# Patient Record
Sex: FEMALE | Race: WHITE | HISPANIC OR LATINO | Employment: FULL TIME | URBAN - NONMETROPOLITAN AREA
[De-identification: names, ages, dates, MRNs, and addresses within clinical notes are randomized per-mention and may not be internally consistent; named-entity substitution may affect disease eponyms.]

---

## 2018-07-31 ENCOUNTER — HOSPITAL ENCOUNTER (OUTPATIENT)
Facility: MEDICAL CENTER | Age: 38
End: 2018-07-31
Attending: PHYSICIAN ASSISTANT
Payer: COMMERCIAL

## 2018-07-31 ENCOUNTER — OFFICE VISIT (OUTPATIENT)
Dept: URGENT CARE | Facility: PHYSICIAN GROUP | Age: 38
End: 2018-07-31
Payer: COMMERCIAL

## 2018-07-31 VITALS
HEIGHT: 67 IN | WEIGHT: 130 LBS | RESPIRATION RATE: 16 BRPM | OXYGEN SATURATION: 99 % | SYSTOLIC BLOOD PRESSURE: 108 MMHG | HEART RATE: 80 BPM | TEMPERATURE: 98 F | BODY MASS INDEX: 20.4 KG/M2 | DIASTOLIC BLOOD PRESSURE: 70 MMHG

## 2018-07-31 DIAGNOSIS — R53.83 FATIGUE, UNSPECIFIED TYPE: ICD-10-CM

## 2018-07-31 DIAGNOSIS — N30.01 ACUTE CYSTITIS WITH HEMATURIA: ICD-10-CM

## 2018-07-31 LAB
APPEARANCE UR: NORMAL
BILIRUB UR STRIP-MCNC: NORMAL MG/DL
COLOR UR AUTO: YELLOW
GLUCOSE BLD-MCNC: 109 MG/DL (ref 70–100)
GLUCOSE UR STRIP.AUTO-MCNC: NORMAL MG/DL
INT CON NEG: NORMAL
INT CON POS: NORMAL
KETONES UR STRIP.AUTO-MCNC: NORMAL MG/DL
LEUKOCYTE ESTERASE UR QL STRIP.AUTO: NORMAL
NITRITE UR QL STRIP.AUTO: NORMAL
PH UR STRIP.AUTO: 7 [PH] (ref 5–8)
POC URINE PREGNANCY TEST: NEGATIVE
PROT UR QL STRIP: NORMAL MG/DL
RBC UR QL AUTO: NORMAL
SP GR UR STRIP.AUTO: 1.02
UROBILINOGEN UR STRIP-MCNC: 0.2 MG/DL

## 2018-07-31 PROCEDURE — 81025 URINE PREGNANCY TEST: CPT | Performed by: PHYSICIAN ASSISTANT

## 2018-07-31 PROCEDURE — 87086 URINE CULTURE/COLONY COUNT: CPT

## 2018-07-31 PROCEDURE — 82962 GLUCOSE BLOOD TEST: CPT | Performed by: PHYSICIAN ASSISTANT

## 2018-07-31 PROCEDURE — 81002 URINALYSIS NONAUTO W/O SCOPE: CPT | Performed by: PHYSICIAN ASSISTANT

## 2018-07-31 PROCEDURE — 87077 CULTURE AEROBIC IDENTIFY: CPT

## 2018-07-31 PROCEDURE — 99214 OFFICE O/P EST MOD 30 MIN: CPT | Performed by: PHYSICIAN ASSISTANT

## 2018-07-31 RX ORDER — NITROFURANTOIN 25; 75 MG/1; MG/1
100 CAPSULE ORAL EVERY 12 HOURS
Qty: 10 CAP | Refills: 0 | Status: SHIPPED | OUTPATIENT
Start: 2018-07-31 | End: 2018-08-05

## 2018-07-31 ASSESSMENT — ENCOUNTER SYMPTOMS
NECK PAIN: 0
SHORTNESS OF BREATH: 0
SORE THROAT: 0
NAUSEA: 0
CHILLS: 0
SPUTUM PRODUCTION: 0
MUSCULOSKELETAL NEGATIVE: 1
ABDOMINAL PAIN: 0
COUGH: 0
FATIGUE: 1
HEADACHES: 0
DIZZINESS: 0
VOMITING: 0
FEVER: 0
FLANK PAIN: 0
VISUAL CHANGE: 0
DIARRHEA: 0
CHANGE IN BOWEL HABIT: 0

## 2018-08-01 DIAGNOSIS — N30.01 ACUTE CYSTITIS WITH HEMATURIA: ICD-10-CM

## 2018-08-01 NOTE — PROGRESS NOTES
"Subjective:      Leighann Wolf is a 38 y.o. female who presents with Weakness (x2wks ); Fatigue (x2wks); and Shaking (x2wks)            Fatigue   This is a new problem. The current episode started 1 to 4 weeks ago (3 weeks). The problem occurs constantly. The problem has been unchanged. Associated symptoms include fatigue and urinary symptoms. Pertinent negatives include no abdominal pain, change in bowel habit, chest pain, chills, congestion, coughing, fever, headaches, nausea, neck pain, rash, sore throat, visual change or vomiting. Nothing aggravates the symptoms. She has tried nothing for the symptoms.       Review of Systems   Constitutional: Positive for fatigue. Negative for chills and fever.   HENT: Negative for congestion, ear pain and sore throat.    Respiratory: Negative for cough, sputum production and shortness of breath.    Cardiovascular: Negative for chest pain.   Gastrointestinal: Negative for abdominal pain, change in bowel habit, diarrhea, nausea and vomiting.   Genitourinary: Positive for frequency. Negative for dysuria, flank pain, hematuria and urgency.   Musculoskeletal: Negative.  Negative for neck pain.   Skin: Negative for rash.   Neurological: Negative for dizziness and headaches.        Objective:     /70   Pulse 80   Temp 36.7 °C (98 °F)   Resp 16   Ht 1.702 m (5' 7\")   Wt 59 kg (130 lb)   SpO2 99%   BMI 20.36 kg/m²      Physical Exam   Constitutional: She is oriented to person, place, and time. She appears well-developed and well-nourished. No distress.   HENT:   Head: Normocephalic and atraumatic.   Right Ear: Hearing, tympanic membrane, external ear and ear canal normal.   Left Ear: Hearing, tympanic membrane, external ear and ear canal normal.   Mouth/Throat: Oropharynx is clear and moist. No oropharyngeal exudate, posterior oropharyngeal edema or posterior oropharyngeal erythema.   Eyes: Pupils are equal, round, and reactive to light. Conjunctivae are normal. Right eye " exhibits no discharge. Left eye exhibits no discharge.   Neck: Normal range of motion.   Cardiovascular: Normal rate and regular rhythm.  Exam reveals no friction rub.    Murmur heard.  Pulmonary/Chest: Effort normal and breath sounds normal. No respiratory distress. She has no wheezes. She has no rales.   Abdominal: Soft. Bowel sounds are normal. She exhibits no distension. There is no tenderness. There is no guarding.   No CVAT bilaterally   Musculoskeletal: Normal range of motion.   Neurological: She is alert and oriented to person, place, and time.   Skin: Skin is warm and dry. She is not diaphoretic.   Psychiatric: She has a normal mood and affect. Her behavior is normal.   Nursing note and vitals reviewed.            PMH:  has a past medical history of Immunizations incomplete (10/8/2014).  MEDS:   Current Outpatient Prescriptions:   •  nitrofurantoin monohydr macro (MACROBID) 100 MG Cap, Take 1 Cap by mouth every 12 hours for 5 days., Disp: 10 Cap, Rfl: 0  •  ibuprofen (MOTRIN) 200 MG Tab, Take 200 mg by mouth every 6 hours as needed., Disp: , Rfl:   •  Diclofenac Sodium 1 % Gel, Apply 2-4 grams to affected area 4 times daily as needed for pain., Disp: 1 Tube, Rfl: 0  •  cyclobenzaprine (FLEXERIL) 10 MG Tab, Take 1 Tab by mouth 3 times a day as needed., Disp: 30 Tab, Rfl: 0  ALLERGIES: No Known Allergies  SURGHX: History reviewed. No pertinent surgical history.  SOCHX:  reports that she has never smoked. She has never used smokeless tobacco. She reports that she drinks about 1.5 oz of alcohol per week . She reports that she does not use drugs.  FH: family history includes Heart Disease in her mother.    POCT Urinalysis:  Component Results     Component Value Ref Range & Units Status   POC Color Yellow  Negative Final   POC Appearance slightly cloudy  Negative Final   POC Leukocyte Esterase Moderate  Negative Final   POC Nitrites Neg  Negative Final   POC Urobiligen 0.2  Negative (0.2) mg/dL Final   POC  Protein Neg  Negative mg/dL Final   POC Urine PH 7.0  5.0 - 8.0 Final   POC Blood Moderate  Negative Final   POC Specific Gravity 1.020  <1.005 - >1.030 Final   POC Ketones Neg  Negative mg/dL Final   POC Bilirubin Neg  Negative mg/dL Final   POC Glucose Neg  Negative mg/dL Final   Last Resulted Time   Tue Jul 31, 2018  5:31 PM       Assessment/Plan:     1. Acute cystitis with hematuria  - POCT Urinalysis --> + leuks, + blood  - URINE CULTURE(NEW); Future  - nitrofurantoin monohydr macro (MACROBID) 100 MG Cap; Take 1 Cap by mouth every 12 hours for 5 days.  Dispense: 10 Cap; Refill: 0   - Complete full course of antibiotics as prescribed   - Pt educated on preventative measures for avoiding future UTIs  - Advised to increase fluid intake  - OTC Pyridium (Azo) for symptomatic relief, advised that it will turn urine orange in color  - Pending urine culture  - ER precautions given regarding pyelonephritis including fevers greater than 101 and, vomiting and dehydration, increased back pain.      2. Fatigue, unspecified type  - POCT Pregnancy --> negative  - POCT Glucose --> 109 (non-fasting)    Encouraged to follow up with PCP if fatigue has not resolved after treatment of UTI. The patient demonstrated a good understanding and agreed with the treatment plan.

## 2018-08-03 ENCOUNTER — TELEPHONE (OUTPATIENT)
Dept: URGENT CARE | Facility: PHYSICIAN GROUP | Age: 38
End: 2018-08-03

## 2018-08-03 LAB
BACTERIA UR CULT: ABNORMAL
BACTERIA UR CULT: ABNORMAL
SIGNIFICANT IND 70042: ABNORMAL
SITE SITE: ABNORMAL
SOURCE SOURCE: ABNORMAL

## 2018-08-04 NOTE — TELEPHONE ENCOUNTER
Spoke to patient and informed her of positive urine culture result for Group B strep. She states she is feeling much improved and denies any urinary symptoms. Fatigue has improved. She appreciates the phone call.

## 2018-10-17 ENCOUNTER — OCCUPATIONAL MEDICINE (OUTPATIENT)
Dept: URGENT CARE | Facility: PHYSICIAN GROUP | Age: 38
End: 2018-10-17
Payer: COMMERCIAL

## 2018-10-17 VITALS
SYSTOLIC BLOOD PRESSURE: 114 MMHG | TEMPERATURE: 98.6 F | WEIGHT: 135 LBS | OXYGEN SATURATION: 97 % | BODY MASS INDEX: 21.19 KG/M2 | DIASTOLIC BLOOD PRESSURE: 76 MMHG | HEIGHT: 67 IN | HEART RATE: 76 BPM | RESPIRATION RATE: 16 BRPM

## 2018-10-17 DIAGNOSIS — S29.019A THORACIC MYOFASCIAL STRAIN, INITIAL ENCOUNTER: ICD-10-CM

## 2018-10-17 PROCEDURE — 99214 OFFICE O/P EST MOD 30 MIN: CPT | Performed by: FAMILY MEDICINE

## 2018-10-17 RX ORDER — CYCLOBENZAPRINE HCL 5 MG
5-10 TABLET ORAL 3 TIMES DAILY PRN
Qty: 30 TAB | Refills: 0
Start: 2018-10-17 | End: 2020-05-15

## 2018-10-17 NOTE — LETTER
"   [address] [place]  [place] [address]  Phone:  [phone] - Fax:  [phone]   [facility] Progress Report and Disability Certification  Date of Service: 10/17/2018   No Show:  No  Date / Time of Next Visit: 10/24/2018   Claim Information   Patient Name: Leighann Wolf  Claim Number:     Employer: [employer]  Date of Injury: 10/14/2018     Insurer / TPA: [employer]  ID / SSN:     Occupation: CNA  Diagnosis: The encounter diagnosis was Thoracic myofascial strain, initial encounter.    Medical Information   Related to Industrial Injury? Yes    Subjective Complaints:  DOI: 10/14    Back Pain  This is a new problem.  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      The problem occurs constantly. The problem is unchanged. The pain is present in the lumbar and thoracic spine. The quality of the pain is described as \"pressure\". The pain does not radiate. The pain is mild. The symptoms are aggravated by bending over. Pertinent negatives include no bladder incontinence, bowel incontinence, dysuria, fever, headaches, numbness or weakness. Treatments tried: motrin.  The treatment provided some relief.    Objective Findings: Musculoskeletal: pt exhibits no edema.                  Lumbar spine: pt exhibits full AROM and no muscular or bony tenderness.    . Pt exhibits no bony tenderness, no  Muscular tenderness or spasms.     Thoracic spine - no bony tenderness, no edema.   There is R>L muscular tenderness and spasm   Pre-Existing Condition(s):     Assessment:   Initial Visit    Status: Additional Care Required  Permanent Disability:No    Plan: Medication    Diagnostics:      Comments:       Disability Information   Status: Released to Restricted Duty    From:  10/17/2018  Through: 10/24/2018 Restrictions are: Temporary   Physical Restrictions   Sitting:    Standing:    Stooping:  " Bendin hrs/day   Squatting:    Walking:    Climbing:    Pushing:      Pulling:    Other:    Reaching Above Shoulder (L):   Reaching Above Shoulder (R):       Reaching Below Shoulder (L):    Reaching Below Shoulder (R):      Not to exceed Weight Limits   Carrying(hrs):   Weight Limit(lb): < or = to 10 pounds Lifting(hrs):   Weight  Limit(lb): < or = to 10 pounds   Comments: Thoracic myofascial strain, initial encounter   continue prn motrin  Heating pad as needed  Restrictions per D39  F/u 3-5 d  - cyclobenzaprine (FLEXERIL) 5 MG tablet; Take 1-2 Tabs by mouth 3 times a day as needed.  Dispense: 30 Tab; Refill: 0      Repetitive Actions   Hands: i.e. Fine Manipulations from Grasping:     Feet: i.e. Operating Foot Controls:     Driving / Operate Machinery:     Physician Name: Vikash Alejandre M.D. Physician Signature: VIKASH Montero M.D. e-Signature: Dr. Checo Taylor, Medical Director   Clinic Name / Location: [address] [address] Clinic Phone Number: Dept: [phone]   Appointment Time: 3:00 Pm Visit Start Time: 4:08 PM   Check-In Time:  3:56 Pm Visit Discharge Time:  4:25 PM   Original-Treating Physician or Chiropractor    Page 2-Insurer/TPA    Page 3-Employer    Page 4-Employee

## 2018-10-17 NOTE — LETTER
"EMPLOYEE’S CLAIM FOR COMPENSATION/ REPORT OF INITIAL TREATMENT  FORM C-4    EMPLOYEE’S CLAIM - PROVIDE ALL INFORMATION REQUESTED   First Name  Leighann Last Name  Luciano Birthdate                    1980                Sex  female Claim Number   Home Address  [address] Age  38 y.o. Height  1.702 m (5' 7\") Weight  61.2 kg (135 lb) N     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 (if applicable)  TRASFERRING RESIDENT     How did this injury or occupational disease occur? (Be specific an answer in detail. Use additional sheet if necessary)  CO-WORKER AND I WERE TRANSFERRING A RESIDENT TO BED,RESIDENT LEANED FORWARD , ALMOST FELL. I PULLED BACK TO GET HIM INTO A SITTING POSITION ON TO THE BED AND HURT MY BACK    If you believe that you have an occupational disease, when did you first have knowledge of the disability and it relationship to your employment?  NA Witnesses to the Accident  SRINIVASA      Nature of Injury or Occupational Disease  Workers' Compensation  Part(s) of Body Injured or Affected  Lower Back Area (Lumbar Area & Lumbo-Sacral), Upper Back Area (Thoracic Area), " Lumbar and/or Sacral Vertebrae (Vertebrae NOC Trunk)    I certify that the above is true and correct to the best of my knowledge and that I have provided this information in order to obtain the benefits of [place]  I hereby authorize any physician, chiropractor, surgeon, practitioner, or other person, any hospital, including [facility] or [facility], any medical service organization, any insurance company, or other institution or organization to release to each other, any medical or other information, including benefits paid or payable, pertinent to this injury or disease, except information relative to diagnosis, treatment and/or counseling for AIDS, psychological conditions, alcohol or controlled substances, for which I must give specific authorization.  A Photostat of this authorization shall be as valid as the original.     Date 10/17/2018   [facility]   Employee’s Signature   THIS REPORT MUST BE COMPLETED AND MAILED WITHIN 3 WORKING DAYS OF TREATMENT   [facility]  Name of [facility]   Date  10/17/2018 Diagnosis  (S29.019A) Thoracic myofascial strain, initial encounter Is there evidence the injured employee was under the influence of alcohol and/or another controlled substance at the time of accident?   Hour  4:08 PM Description of Injury or Disease  The encounter diagnosis was Thoracic myofascial strain, initial encounter. No   Treatment  Thoracic myofascial strain, initial encounter   continue prn motrin  Heating pad as needed  Restrictions per D39  F/u 3-5 d  - cyclobenzaprine (FLEXERIL) 5 MG tablet; Take 1-2 Tabs by mouth 3 times a day as needed.  Dispense: 30 Tab; Refill: 0    Have you advised the patient to remain off work five days or more? No   X-Ray Findings      If Yes   From Date  To Date      From information given by the employee,  "together with medical evidence, can you directly connect this injury or occupational disease as job incurred?  Yes If No Full Duty  No Modified Duty  Yes   Is additional medical care by a physician indicated?  Yes If Modified Duty, Specify any Limitations / Restrictions  Restrictions per D39     Do you know of any previous injury or disease contributing to this condition or occupational disease?                            No   Date  10/17/2018 Print Doctor’s Name Vikash Alejandre M.D. I certify the employer’s copy of  this form was mailed on:   Address  [address] Insurer’s Use Only     [place]  [address]    Provider’s Tax ID Number  [id number] Telephone  Dept: [phone]        e-VIKASH Mendes M.D.   e-Signature: Dr. Checo Taylor, Medical Director Degree  MD TAVERAS-TREATING PHYSICIAN OR CHIROPRACTOR    PAGE 2-INSURER/TPA    PAGE 3-EMPLOYER    PAGE 4-EMPLOYEE             Form C-4 (rev10/07)              BRIEF DESCRIPTION OF RIGHTS AND BENEFITS  (Pursuant to [place] 616C.050)    Notice of Injury or Occupational Disease (Incident Report Form C-1): If an injury or occupational disease (OD) arises out of and in the  course of employment, you must provide written notice to your employer as soon as practicable, but no later than 7 days after the accident or  OD. Your employer shall maintain a sufficient supply of the required forms.    Claim for Compensation (Form C-4): If medical treatment is sought, the form C-4 is available at the place of initial treatment. A completed  \"Claim for Compensation\" (Form C-4) must be filed within 90 days after an accident or OD. The treating physician or chiropractor must,  within 3 working days after treatment, complete and mail to the employer, the employer's insurer and third-party , the Claim for  Compensation.    Medical Treatment: If you require medical treatment for your on-the-job injury or OD, you may be required to " select a physician or  chiropractor from a list provided by your workers’ compensation insurer, if it has contracted with an Organization for Managed Care (MCO) or  Preferred Provider Organization (PPO) or providers of health care. If your employer has not entered into a contract with an MCO or PPO, you  may select a physician or chiropractor from the Panel of Physicians and Chiropractors. Any medical costs related to your industrial injury or  OD will be paid by your insurer.    Temporary Total Disability (TTD): If your doctor has certified that you are unable to work for a period of at least 5 consecutive days, or 5  cumulative days in a 20-day period, or places restrictions on you that your employer does not accommodate, you may be entitled to TTD  compensation.    Temporary Partial Disability (TPD): If the wage you receive upon reemployment is less than the compensation for TTD to which you are  entitled, the insurer may be required to pay you TPD compensation to make up the difference. TPD can only be paid for a maximum of 24  months.    Permanent Partial Disability (PPD): When your medical condition is stable and there is an indication of a PPD as a result of your injury or  OD, within 30 days, your insurer must arrange for an evaluation by a rating physician or chiropractor to determine the degree of your PPD. The  amount of your PPD award depends on the date of injury, the results of the PPD evaluation and your age and wage.    Permanent Total Disability (PTD): If you are medically certified by a treating physician or chiropractor as permanently and totally disabled  and have been granted a PTD status by your insurer, you are entitled to receive monthly benefits not to exceed 66 2/3% of your average  monthly wage. The amount of your PTD payments is subject to reduction if you previously received a PPD award.    Vocational Rehabilitation Services: You may be eligible for vocational rehabilitation services if  you are unable to return to the job due to a  permanent physical impairment or permanent restrictions as a result of your injury or occupational disease.    Transportation and Per Dhiraj Reimbursement: You may be eligible for travel expenses and per dhiraj associated with medical treatment.    Reopening: You may be able to reopen your claim if your condition worsens after claim closure.    Appeal Process: If you disagree with a written determination issued by the insurer or the insurer does not respond to your request, you may  appeal to the Department of Administration, , by following the instructions contained in your determination letter. You must  appeal the determination within 70 days from the date of the determination letter at [address], [address], [place], [place]  [address], or [address], [address], [place], [place] [address]. If you disagree with the  decision, you may appeal to the  Department of Administration, . You must file your appeal within 30 days from the date of the  decision  letter at [address], [address], [place], [place] [address], or [address], [address] [facility] [facility] [address] [facility] [address], [place], [place] [address]. If you  disagree with a decision of an , you may file a petition for judicial review with the District Court. You must do so within 30  days of the Appeal Officer’s decision. You may be represented by an  at your own expense or you may contact the [facility] for possible  representation.    [place]  for Injured Workers (NAIW): If you disagree with a  decision, you may request that NAIW represent you  without charge at an  Hearing. For information regarding denial of benefits, you may contact the [facility] at: [address], [address], [place] [address], [phone], or [address] [facility] [address], [place] [address], [phone]    To File a  Complaint with the Division: If you wish to file a complaint with the  of the Division of Industrial Relations (DIR),  please contact the Workers’ Compensation Section, [address], [address], [place], [place] [address], telephone [phone], or  [address], [address], [place], [place] [address], telephone [phone].    For assistance with Workers’ Compensation Issues: you may contact the Office of the Governor Consumer Health Assistance, [address] [place] [address], [address], [place], [place] [address], Toll Free 1-756.302.1619, Web site: [email or web address] access [email or web address], E-mail  [email or web address] access [email or web address].                                                                                                                                                                                                                                   __________________________________________________________________                                                                   ___10/17/2018________                Employee Name / Signature                                                                                                                                                       Date                                                                                                                                                                                                     D-2 (rev. 10/07)

## 2018-10-17 NOTE — PROGRESS NOTES
"  DOI: 10/14    Back Pain  This is a new problem.  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      The problem occurs constantly. The problem is unchanged. The pain is present in the lumbar and thoracic spine. The quality of the pain is described as \"pressure\". The pain does not radiate. The pain is mild. The symptoms are aggravated by bending over. Pertinent negatives include no bladder incontinence, bowel incontinence, dysuria, fever, headaches, numbness or weakness. Treatments tried: motrin.  The treatment provided some relief.     Social History   Substance Use Topics   • Smoking status: Never Smoker   • Smokeless tobacco: Never Used   • Alcohol use 1.5 oz/week     3 Cans of beer per week       Family hx was reviewed - no pertinent past family hx      Past medical history was unremarkable and not pertinent to current issue    Review of Systems   Constitutional: Negative for fever.   Gastrointestinal: Negative for bowel incontinence.   Genitourinary: Negative for bladder incontinence, dysuria, urgency and frequency.   Musculoskeletal: Positive for back pain.   Neurological: Negative for weakness, numbness and headaches.   All other systems reviewed and are negative.         Objective:     Blood pressure 116/72, pulse 72, temperature 36.4 °C (97.6 °F), temperature source Temporal, resp. rate 16, height 1.702 m (5' 7\"), weight 59.4 kg (131 lb), SpO2 97 %, not currently breastfeeding.    Physical Exam   Constitutional: pt is oriented to person, place, and time. Pt appears well-developed and well-nourished. No distress.   HENT:   Head: Normocephalic and atraumatic.   Eyes: EOM are normal. Pupils are equal, round, and reactive to light. No scleral icterus.   Neck: Normal range of motion. Neck supple. No thyromegaly present.   Cardiovascular: Normal rate, regular rhythm and normal heart sounds.    Pulmonary/Chest: Effort normal and breath sounds normal. No " respiratory distress.  no wheezes.   no rales.  no tenderness.   Musculoskeletal: pt exhibits no edema.                  Lumbar spine: pt exhibits full AROM and no muscular or bony tenderness.    . Pt exhibits no bony tenderness, no  Muscular tenderness or spasms.     Thoracic spine - no bony tenderness, no edema.   There is R>L muscular tenderness and spasm  Neurological: patient is alert and oriented to person, place, and time. Patient has normal reflexes. No cranial nerve deficit. Patient exhibits normal muscle tone.      STRAIGHT LEG RAISE, ANDREW NEGATIVE BILAT  Skin: Skin is warm and dry. No rash noted. No erythema.   Psychiatric: patient has a normal mood and affect.  behavior is normal.   Nursing note and vitals reviewed.              Assessment/Plan:        1. Thoracic myofascial strain, initial encounter   continue prn motrin  Heating pad as needed  Restrictions per D39  F/u 3-5 d  - cyclobenzaprine (FLEXERIL) 5 MG tablet; Take 1-2 Tabs by mouth 3 times a day as needed.  Dispense: 30 Tab; Refill: 0

## 2018-10-24 ENCOUNTER — OCCUPATIONAL MEDICINE (OUTPATIENT)
Dept: URGENT CARE | Facility: PHYSICIAN GROUP | Age: 38
End: 2018-10-24
Payer: COMMERCIAL

## 2018-10-24 VITALS
HEART RATE: 84 BPM | OXYGEN SATURATION: 99 % | RESPIRATION RATE: 14 BRPM | DIASTOLIC BLOOD PRESSURE: 76 MMHG | HEIGHT: 67 IN | SYSTOLIC BLOOD PRESSURE: 112 MMHG | WEIGHT: 135 LBS | TEMPERATURE: 97.1 F | BODY MASS INDEX: 21.19 KG/M2

## 2018-10-24 DIAGNOSIS — S29.019D THORACIC MYOFASCIAL STRAIN, SUBSEQUENT ENCOUNTER: ICD-10-CM

## 2018-10-24 PROCEDURE — 99214 OFFICE O/P EST MOD 30 MIN: CPT | Mod: 29 | Performed by: PHYSICIAN ASSISTANT

## 2018-10-24 ASSESSMENT — ENCOUNTER SYMPTOMS
CHILLS: 0
DIZZINESS: 0
BACK PAIN: 1
FEVER: 0

## 2018-10-24 NOTE — PROGRESS NOTES
"Subjective:      Leighann Wolf is a 38 y.o. female who presents with Follow-Up      Subjective: Date of incident 10/17/2018.  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  Reports lower lumbar discomfort and midthoracic discomfort that was worse over the next 24 hours.  Reports the pain is being mild.  Denies numbness or tingling in her lower extremities.  Denied bowel or bladder incontinence.  Has been taking anti-inflammatories, muscle relaxer and modified job duty and states she is much improved.  States she still gets some sensation but it is better.  Change in position makes it worse.  Sitting or walking makes it better.  Denies any previous history of back problems     HPI    Review of Systems   Constitutional: Negative for chills and fever.   Musculoskeletal: Positive for back pain.   Skin: Negative for rash.   Neurological: Negative for dizziness.          Objective:     /76 (BP Location: Right arm, Patient Position: Sitting, BP Cuff Size: Small adult)   Pulse 84   Temp 36.2 °C (97.1 °F) (Temporal)   Resp 14   Ht 1.702 m (5' 7\")   Wt 61.2 kg (135 lb)   SpO2 99%   BMI 21.14 kg/m²      Physical Exam    General: Pleasant woman no acute distress  Vitals are unremarkable  HEENT: Unremarkable  Neck: Soft supple without cervical lymphadenopathy  Cardiovascular: Regular rate and rhythm  Lungs clear to auscultation bilaterally  Musculoskeletal: No pain to palpation on the cervical, thoracic or lumbar spinous processes.  Reports intermittent discomfort in the lumbar paraspinous region, but reports she has none currently.  Has full range of motion of her lower extremities, 5 out of 5 strength against resistance and full ability to flex at the waist.  Negative straight leg raise       Assessment/Plan:     1. Thoracic myofascial strain, subsequent encounter  D39 filled out.  Modified job duty given for 1 week more.  Follow-up with us at that time.  Take anti-inflammatories and " cyclobenzaprine as previously prescribed as needed.  Follow-up with us in 5-7 business days

## 2018-10-24 NOTE — LETTER
[facility]  [address] [place] [address] [place]  [place] [address]  Phone:  [phone] - Fax:  [phone]   [facility] Progress Report and Disability Certification  Date of Service: 10/24/2018   No Show:  No  Date / Time of Next Visit: 10/31/2018   Claim Information   Patient Name: Leighann Wolf  Claim Number:     Employer: [employer]  Date of Injury: 10/14/2018     Insurer / TPA: [employer]  ID / SSN:     Occupation: CNA  Diagnosis: The encounter diagnosis was Thoracic myofascial strain, subsequent encounter.    Medical Information   Related to Industrial Injury? Yes    Subjective Complaints:  Subjective: Date of incident 10/17/2018.  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  Reports lower lumbar discomfort and midthoracic discomfort that was worse over the next 24 hours.  Reports the pain is being mild.  Denies numbness or tingling in her lower extremities.  Denied bowel or bladder incontinence.  Has been taking anti-inflammatories, muscle relaxer and modified job duty and states she is much improved.  States she still gets some sensation but it is better.  Change in position makes it worse.  Sitting or walking makes it better.  Denies any previous history of back problems   Objective Findings: General: Pleasant woman no acute distress  Vitals are unremarkable  HEENT: Unremarkable  Neck: Soft supple without cervical lymphadenopathy  Cardiovascular: Regular rate and rhythm  Lungs clear to auscultation bilaterally  Musculoskeletal: No pain to palpation on the cervical, thoracic or lumbar spinous processes.  Reports intermittent discomfort in the lumbar paraspinous region, but reports she has none currently.  Has full range of motion of her lower extremities, 5 out of 5 strength against resistance and full ability to flex at the waist.  Negative straight leg raise   Pre-Existing Condition(s):     Assessment:   Condition Improved       Status: Additional Care Required  Permanent Disability:No    Plan:   Comments:Modified job duty and anti-inflammatories as discussed    Diagnostics:      Comments:  Follow-up with us in 1 week    Disability Information   Status: Released to Restricted Duty    From:  10/24/2018  Through: 10/31/2018 Restrictions are: Temporary   Physical Restrictions   Sitting:    Standing:    Stooping:    Bendin hrs/day   Squatting:    Walking:    Climbing:    Pushing:      Pulling:    Other:    Reaching Above Shoulder (L):   Reaching Above Shoulder (R):       Reaching Below Shoulder (L):    Reaching Below Shoulder (R):      Not to exceed Weight Limits   Carrying(hrs):   Weight Limit(lb): < or = to 10 pounds Lifting(hrs):   Weight  Limit(lb):     Comments: No bending or lifting greater than 10 pounds for the next week.  Follow-up with us as needed    Repetitive Actions   Hands: i.e. Fine Manipulations from Grasping:     Feet: i.e. Operating Foot Controls:     Driving / Operate Machinery:     Physician Name: Alejandra Diggs P.A.-C. Physician Signature: ALEJANDRA Nam P.A.-C. e-Signature: Dr. Checo Taylor, Medical Director   Clinic Name / Location: [address] [address] Clinic Phone Number: Dept: [phone]   Appointment Time: 2:40 Pm Visit Start Time: 3:31 PM   Check-In Time:  2:42 Pm Visit Discharge Time:  4:00 pm    Original-Treating Physician or Chiropractor    Page 2-Insurer/TPA    Page 3-Employer    Page 4-Employee

## 2018-10-31 ENCOUNTER — OCCUPATIONAL MEDICINE (OUTPATIENT)
Dept: URGENT CARE | Facility: PHYSICIAN GROUP | Age: 38
End: 2018-10-31
Payer: COMMERCIAL

## 2018-10-31 VITALS
SYSTOLIC BLOOD PRESSURE: 106 MMHG | HEART RATE: 96 BPM | DIASTOLIC BLOOD PRESSURE: 64 MMHG | RESPIRATION RATE: 16 BRPM | OXYGEN SATURATION: 98 % | HEIGHT: 67 IN | TEMPERATURE: 97.5 F | WEIGHT: 135 LBS | BODY MASS INDEX: 21.19 KG/M2

## 2018-10-31 DIAGNOSIS — S29.019D THORACIC MYOFASCIAL STRAIN, SUBSEQUENT ENCOUNTER: ICD-10-CM

## 2018-10-31 PROCEDURE — 99213 OFFICE O/P EST LOW 20 MIN: CPT | Mod: 29 | Performed by: PHYSICIAN ASSISTANT

## 2018-10-31 NOTE — PROGRESS NOTES
Chief Complaint   Patient presents with   • Follow-Up       HISTORY OF PRESENT ILLNESS: Patient is a 38 y.o. 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  Feeling better overall. Minimal pain. Continues to have sporadic muscle spasms with certain movements but the same movement does not always produce the muscle spasm. Had a spasm today but had not had one in 2 days prior to this one. Using ibuprofen and heat with relief of her pain. 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    Patient Active Problem List    Diagnosis Date Noted   • Immunizations incomplete 10/08/2014       Allergies:Patient has no known allergies.    Current Outpatient Prescriptions Ordered in [facility]   Medication Sig Dispense Refill   • cyclobenzaprine (FLEXERIL) 5 MG tablet Take 1-2 Tabs by mouth 3 times a day as needed. 15 Tab 0   • cyclobenzaprine (FLEXERIL) 5 MG tablet Take 1-2 Tabs by mouth 3 times a day as needed. 30 Tab 0   • ibuprofen (MOTRIN) 200 MG Tab Take 200 mg by mouth every 6 hours as needed.     • Diclofenac Sodium 1 % Gel Apply 2-4 grams to affected area 4 times daily as needed for pain. 1 Tube 0   • cyclobenzaprine (FLEXERIL) 10 MG Tab Take 1 Tab by mouth 3 times a day as needed. 30 Tab 0     No current Epic-ordered facility-administered medications on file.        Past Medical History:   Diagnosis Date   • Immunizations incomplete 10/8/2014       Social History   Substance Use Topics   • Smoking status: Never Smoker   • Smokeless tobacco: Never Used   • Alcohol use 1.5 oz/week     3 Cans of beer per week       Family Status   Relation Status   • Mo Alive   • Fa Alive     Family History   Problem Relation Age of Onset   • Heart Disease Mother        Review of Systems:   Constitutional ROS: No unexpected change in weight, No weakness, No fatigue  Eye ROS: No recent significant change in vision, No eye pain, redness, discharge  Ear ROS: No  "drainage, No tinnitus or vertigo, No recent change in hearing  Mouth/Throat ROS: No teeth or gum problems, No bleeding gums, No tongue complaints  Neck ROS: No swollen glands, No significant pain in neck  Pulmonary ROS: No chronic cough, sputum, or hemoptysis, No dyspnea on exertion, No wheezing  Cardiovascular ROS: No diaphoresis, No edema, No palpitations  Gastrointestinal ROS: No change in bowel habits, No significant change in appetite, No nausea, vomiting, diarrhea, or constipation  Hematologic/Lymphatic ROS: No chills, No night sweats, No weight loss  Skin/Integumentary ROS: No edema, No evidence of rash, No itching      Exam:  Blood pressure 106/64, pulse 96, temperature 36.4 °C (97.5 °F), temperature source Temporal, resp. rate 16, height 1.702 m (5' 7\"), weight 61.2 kg (135 lb), SpO2 98 %, not currently breastfeeding.  General: Well developed, well nourished. No distress.  Pulmonary: Unlabored respiratory effort.    Back: No localized tenderness noted.  Neurologic: Grossly nonfocal. No facial asymmetry noted.  Skin: Warm, dry, good turgor. No rashes in visible areas.   Psych: Normal mood. Alert and oriented x3. Judgment and insight is normal.    Assessment/Plan:  Continue ibuprofen, heat, and consider adding OTC pain patch for additional pain relief. Continue current restrictions. Follow up in one week for re-evaluation, sooner for any significant change in symptoms.  1. Thoracic myofascial strain, subsequent encounter         "

## 2018-10-31 NOTE — LETTER
"   [address] [place]  [place] [address]  Phone:  [phone] - Fax:  [phone]   [facility] Progress Report and Disability Certification  Date of Service: 10/31/2018   No Show:  No  Date / Time of Next Visit: 11/7/2018   Claim Information   Patient Name: Leighann Wolf  Claim Number:     Employer: [employer]  Date of Injury: 10/14/2018     Insurer / TPA: [employer]  ID / SSN:     Occupation: CNA  Diagnosis: The encounter diagnosis was Thoracic myofascial strain, subsequent encounter.    Medical Information   Related to Industrial Injury? 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  Feeling better overall. Minimal pain. Continues to have sporadic muscle spasms with certain movements but the same movement does not always produce the muscle spasm. Had a spasm today but had not had one in 2 days prior to this one. Using ibuprofen and heat with relief of her pain. 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   Objective Findings: Blood pressure 106/64, pulse 96, temperature 36.4 °C (97.5 °F), temperature source Temporal, resp. rate 16, height 1.702 m (5' 7\"), weight 61.2 kg (135 lb), SpO2 98 %, not currently breastfeeding.  General: Well developed, well nourished. No distress.  Pulmonary: Unlabored respiratory effort.    Back: No localized tenderness noted.  Neurologic: Grossly nonfocal. No facial asymmetry noted.  Skin: Warm, dry, good turgor. No rashes in visible areas.   Psych: Normal mood. Alert and oriented x3. Judgment and insight is normal.   Pre-Existing Condition(s):     Assessment:   Condition Improved    Status: Additional Care Required  Permanent Disability:No    Plan:      Diagnostics:      Comments:  Assessment/Plan:  Continue ibuprofen, heat, and consider adding OTC pain patch for additional pain relief. Continue current restrictions. Follow up in " one week for re-evaluation, sooner for any significant change in symptoms.  1. Thoracic myofascial   strain, subsequent encounter      Disability Information   Status: Released to Restricted Duty    From:  10/31/2018  Through: 2018 Restrictions are: Temporary   Physical Restrictions   Sitting:    Standing:    Stoopin hrs/day Bendin hrs/day   Squattin hrs/day Walking:    Climbing:    Pushing:      Pulling:    Other:    Reaching Above Shoulder (L):   Reaching Above Shoulder (R):       Reaching Below Shoulder (L):    Reaching Below Shoulder (R):      Not to exceed Weight Limits   Carrying(hrs):   Weight Limit(lb):   Lifting(hrs):   Weight  Limit(lb):     Comments: No lifting, carrying, pushing, or pulling more than 10#    Repetitive Actions   Hands: i.e. Fine Manipulations from Grasping:     Feet: i.e. Operating Foot Controls:     Driving / Operate Machinery:     Physician Name: Holly Javier P.A.-C. Physician Signature: HOLLY Torres P.A.-C. e-Signature: Dr. Checo Taylor, Medical Director   Clinic Name / Location: [address] [address] Clinic Phone Number: Dept: [phone]   Appointment Time: 2:40 Pm Visit Start Time: 2:44 PM   Check-In Time:  2:36 Pm Visit Discharge Time:  3:05 PM   Original-Treating Physician or Chiropractor    Page 2-Insurer/TPA    Page 3-Employer    Page 4-Employee

## 2018-11-07 ENCOUNTER — OCCUPATIONAL MEDICINE (OUTPATIENT)
Dept: URGENT CARE | Facility: PHYSICIAN GROUP | Age: 38
End: 2018-11-07
Payer: COMMERCIAL

## 2018-11-07 VITALS
OXYGEN SATURATION: 97 % | TEMPERATURE: 97.3 F | HEIGHT: 67 IN | SYSTOLIC BLOOD PRESSURE: 110 MMHG | RESPIRATION RATE: 14 BRPM | WEIGHT: 135 LBS | HEART RATE: 80 BPM | DIASTOLIC BLOOD PRESSURE: 62 MMHG | BODY MASS INDEX: 21.19 KG/M2

## 2018-11-07 DIAGNOSIS — S29.019D THORACIC MYOFASCIAL STRAIN, SUBSEQUENT ENCOUNTER: ICD-10-CM

## 2018-11-07 PROCEDURE — 99214 OFFICE O/P EST MOD 30 MIN: CPT | Mod: 29 | Performed by: PHYSICIAN ASSISTANT

## 2018-11-07 NOTE — LETTER
[facility]  [facility] [place] [address]  Phone:  [phone] - Fax:  [phone]   [facility] Progress Report and Disability Certification  Date of Service: 2018   No Show:  No  Date / Time of Next Visit: 2018   Claim Information   Patient Name: Leighann Wolf  Claim Number:     Employer: [employer]  Date of Injury: 10/14/2018     Insurer / TPA: [employer]  ID / SSN:     Occupation: CNA  Diagnosis: The encounter diagnosis was Thoracic myofascial strain, subsequent encounter.    Medical Information   Related to Industrial Injury? Yes    Subjective Complaints:  Continued but improving lower back pain after injury at work on 10/14/2018.   Objective Findings: Lumbar spine is without any visual deformity, erythema, edema or ecchymosis.  She has minimal if any tenderness to palpation, good range of motion   Pre-Existing Condition(s):     Assessment:   Condition Improved    Status: Additional Care Required  Comments:Follow-up in 1 week  Permanent Disability:No    Plan: Medication  Comments:Over-the-counter anti-inflammatories and muscle rubs as tolerated    Diagnostics:      Comments:       Disability Information   Status: Released to Restricted Duty    From:  2018  Through: 2018 Restrictions are: Temporary   Physical Restrictions   Sitting:    Standing:    Stoopin hrs/day Bending:      Squatting:    Walking:    Climbin hrs/day Pushing:    Comments:25 pounds or less   Pulling:    Comments:25 pounds or less Other:    Reaching Above Shoulder (L):   Reaching Above Shoulder (R):       Reaching Below Shoulder (L):    Reaching Below Shoulder (R):      Not to exceed Weight Limits   Carrying(hrs):   Weight Limit(lb): < or = to 25 pounds Lifting(hrs):   Weight  Limit(lb): < or = to 25 pounds   Comments:      Repetitive Actions   Hands: i.e. Fine Manipulations from Grasping:     Feet: i.e. Operating Foot Controls:        Driving / Operate Machinery:     Physician Name: Juanita Terry P.A.-C. Physician Signature: JUANITA Fragoso P.A.-C. e-Signature: Dr. Checo Taylor, Medical Director   Clinic Name / Location: [address] [address] Clinic Phone Number: Dept: [phone]   Appointment Time: 2:40 Pm Visit Start Time: 2:31 PM   Check-In Time:  2:30 Pm Visit Discharge Time:  2:42 PM   Original-Treating Physician or Chiropractor    Page 2-Insurer/TPA    Page 3-Employer    Page 4-Employee

## 2018-11-07 NOTE — PROGRESS NOTES
Chief Complaint   Patient presents with   • Follow-Up       HISTORY OF PRESENT ILLNESS: Patient is a 38 y.o. female who presents today because she is here for work comp follow-up visit.    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  Reported  lower lumbar discomfort and midthoracic discomfort that was worse over the next 24 hours.    She has been seen multiple times in urgent care, prescribed muscle relaxants and advised to use over-the-counter anti-inflammatories and apply heat.    Since her last visit 1 week ago, she continues to have improvement, but is hesitant about returning to work full duty as she may need to lift 50 pounds or more.  She only has left-sided lower back pain with certain movements    Patient Active Problem List    Diagnosis Date Noted   • Immunizations incomplete 10/08/2014       Allergies:Patient has no known allergies.    Current Outpatient Prescriptions Ordered in [facility]   Medication Sig Dispense Refill   • cyclobenzaprine (FLEXERIL) 5 MG tablet Take 1-2 Tabs by mouth 3 times a day as needed. 15 Tab 0   • cyclobenzaprine (FLEXERIL) 5 MG tablet Take 1-2 Tabs by mouth 3 times a day as needed. 30 Tab 0   • ibuprofen (MOTRIN) 200 MG Tab Take 200 mg by mouth every 6 hours as needed.     • Diclofenac Sodium 1 % Gel Apply 2-4 grams to affected area 4 times daily as needed for pain. 1 Tube 0   • cyclobenzaprine (FLEXERIL) 10 MG Tab Take 1 Tab by mouth 3 times a day as needed. 30 Tab 0     No current Epic-ordered facility-administered medications on file.        Past Medical History:   Diagnosis Date   • Immunizations incomplete 10/8/2014       Social History   Substance Use Topics   • Smoking status: Never Smoker   • Smokeless tobacco: Never Used   • Alcohol use 1.5 oz/week     3 Cans of beer per week       Family Status   Relation Status   • Mo Alive   • Fa Alive     Family History   Problem Relation Age of  Onset   • Heart Disease Mother        ROS:  Review of Systems   Constitutional: Negative for fever, chills, weight loss and malaise/fatigue.   HENT: Negative for ear pain, nosebleeds, congestion, sore throat and neck pain.    Eyes: Negative for blurred vision.   Respiratory: Negative for cough, sputum production, shortness of breath and wheezing.    Cardiovascular: Negative for chest pain, palpitations, orthopnea and leg swelling.   Gastrointestinal: Negative for heartburn, nausea, vomiting and abdominal pain.   Genitourinary: Negative for dysuria, urgency and frequency.     Exam:  not currently breastfeeding.  General:  Well nourished, well developed female in NAD  Head:Normocephalic, atraumatic  Eyes: PERRLA, EOM within normal limits, no conjunctival injection, no scleral icterus, visual fields and acuity grossly intact.  Extremities: no clubbing, cyanosis, or edema.  Musculoskeletal:Lumbar spine is without any visual deformity, erythema, edema or ecchymosis.  She has minimal if any tenderness to palpation, good range of motion      Please note that this dictation was created using voice recognition software. I have made every reasonable attempt to correct obvious errors, but I expect that there are errors of grammar and possibly content that I did not discover before finalizing the note.    Assessment/Plan:  1. Thoracic myofascial strain, subsequent encounter       Increase lifting restrictions to 25 pounds, gentle range of motion, continue ibuprofen and muscle cramps as needed.  Follow-up in 1 week

## 2018-11-14 ENCOUNTER — OCCUPATIONAL MEDICINE (OUTPATIENT)
Dept: URGENT CARE | Facility: PHYSICIAN GROUP | Age: 38
End: 2018-11-14
Payer: COMMERCIAL

## 2018-11-14 VITALS
DIASTOLIC BLOOD PRESSURE: 72 MMHG | HEART RATE: 76 BPM | TEMPERATURE: 97.2 F | HEIGHT: 67 IN | BODY MASS INDEX: 21.19 KG/M2 | RESPIRATION RATE: 14 BRPM | OXYGEN SATURATION: 99 % | WEIGHT: 135 LBS | SYSTOLIC BLOOD PRESSURE: 118 MMHG

## 2018-11-14 DIAGNOSIS — S29.019D THORACIC MYOFASCIAL STRAIN, SUBSEQUENT ENCOUNTER: ICD-10-CM

## 2018-11-14 PROCEDURE — 99214 OFFICE O/P EST MOD 30 MIN: CPT | Mod: 29 | Performed by: PHYSICIAN ASSISTANT

## 2018-11-14 NOTE — PROGRESS NOTES
Chief Complaint   Patient presents with   • Follow-Up       HISTORY OF PRESENT ILLNESS: Patient is a 38 y.o. female who presents today because she is following up on a work comp visit.    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 Reported lower lumbar discomfort and midthoracic discomfort that was worse over the next 24 hours. She has been seen multiple times in urgent care, prescribed muscle relaxants and advised to use over-the-counter anti-inflammatories and apply heat.   Since her last visit 1 week ago, she reports complete resolution of her symptoms.      Patient Active Problem List    Diagnosis Date Noted   • Immunizations incomplete 10/08/2014       Allergies:Patient has no known allergies.    Current Outpatient Prescriptions Ordered in [facility]   Medication Sig Dispense Refill   • cyclobenzaprine (FLEXERIL) 5 MG tablet Take 1-2 Tabs by mouth 3 times a day as needed. 15 Tab 0   • cyclobenzaprine (FLEXERIL) 5 MG tablet Take 1-2 Tabs by mouth 3 times a day as needed. 30 Tab 0   • ibuprofen (MOTRIN) 200 MG Tab Take 200 mg by mouth every 6 hours as needed.     • Diclofenac Sodium 1 % Gel Apply 2-4 grams to affected area 4 times daily as needed for pain. 1 Tube 0   • cyclobenzaprine (FLEXERIL) 10 MG Tab Take 1 Tab by mouth 3 times a day as needed. 30 Tab 0     No current Epic-ordered facility-administered medications on file.        Past Medical History:   Diagnosis Date   • Immunizations incomplete 10/8/2014       Social History   Substance Use Topics   • Smoking status: Never Smoker   • Smokeless tobacco: Never Used   • Alcohol use 1.5 oz/week     3 Cans of beer per week       Family Status   Relation Status   • Mo Alive   • Fa Alive     Family History   Problem Relation Age of Onset   • Heart Disease Mother        ROS:  Review of Systems   Constitutional: Negative for fever, chills, weight loss and malaise/fatigue.   HENT:  "Negative for ear pain, nosebleeds, congestion, sore throat and neck pain.    Eyes: Negative for blurred vision.   Respiratory: Negative for cough, sputum production, shortness of breath and wheezing.    Cardiovascular: Negative for chest pain, palpitations, orthopnea and leg swelling.   Gastrointestinal: Negative for heartburn, nausea, vomiting and abdominal pain.   Genitourinary: Negative for dysuria, urgency and frequency.     Exam:  Blood pressure 118/72, pulse 76, temperature 36.2 °C (97.2 °F), temperature source Temporal, resp. rate 14, height 1.702 m (5' 7\"), weight 61.2 kg (135 lb), SpO2 99 %, not currently breastfeeding.  General:  Well nourished, well developed female in NAD  Head:Normocephalic, atraumatic  Eyes: PERRLA, EOM within normal limits, no conjunctival injection, no scleral icterus, visual fields and acuity grossly intact.  Extremities: no clubbing, cyanosis, or edema.  Musculoskeletal : lumbar spine is without any visual deformity, erythema, edema or ecchymosis.  She has good range of motion in all planes without any pain.  No tenderness to palpation.    Please note that this dictation was created using voice recognition software. I have made every reasonable attempt to correct obvious errors, but I expect that there are errors of grammar and possibly content that I did not discover before finalizing the note.    Assessment/Plan:  1. Thoracic myofascial strain, subsequent encounter         MMI       "

## 2018-11-14 NOTE — LETTER
[address] [place]  [place] [address]  Phone:  [phone] - Fax:  [phone]   [facility] Progress Report and Disability Certification  Date of Service: 11/14/2018   No Show:  No  Date / Time of Next Visit:     Claim Information   Patient Name: Leighann Wolf  Claim Number:     Employer: [employer]  Date of Injury: 10/14/2018     Insurer / TPA: [employer]  ID / SSN:     Occupation: CNA  Diagnosis: The encounter diagnosis was Thoracic myofascial strain, subsequent encounter.    Medical Information   Related to Industrial Injury?      Subjective Complaints:  Resolved lower back pain   Objective Findings: Musculoskeletal : lumbar spine is without any visual deformity, erythema, edema or ecchymosis.  She has good range of motion in all planes without any pain.  No tenderness to palpation.   Pre-Existing Condition(s):     Assessment:   Condition Improved    Status: Discharged /  MMI  Permanent Disability:     Plan:      Diagnostics:      Comments:       Disability Information   Status: Released to Full Duty    From:     Through:   Restrictions are:     Physical Restrictions   Sitting:    Standing:    Stooping:    Bending:      Squatting:    Walking:    Climbing:    Pushing:      Pulling:    Other:    Reaching Above Shoulder (L):   Reaching Above Shoulder (R):       Reaching Below Shoulder (L):    Reaching Below Shoulder (R):      Not to exceed Weight Limits   Carrying(hrs):   Weight Limit(lb):   Lifting(hrs):   Weight  Limit(lb):     Comments:      Repetitive Actions   Hands: i.e. Fine Manipulations from Grasping:     Feet: i.e. Operating Foot Controls:     Driving / Operate Machinery:     Physician Name: Michael Terry P.A.-C. Physician Signature: MICHAEL Fragoso P.A.-C. e-Signature: Dr. Checo Taylor, Medical Director   Clinic Name / Location: [address] [place] [address] Clinic Phone  Number: Dept: [phone]   Appointment Time: 2:40 Pm Visit Start Time: 2:53 PM   Check-In Time:  2:31 Pm Visit Discharge Time:  3:26 PM   Original-Treating Physician or Chiropractor    Page 2-Insurer/TPA    Page 3-Employer    Page 4-Employee

## 2018-11-25 ENCOUNTER — OFFICE VISIT (OUTPATIENT)
Dept: URGENT CARE | Facility: PHYSICIAN GROUP | Age: 38
End: 2018-11-25
Payer: COMMERCIAL

## 2018-11-25 VITALS
WEIGHT: 130 LBS | SYSTOLIC BLOOD PRESSURE: 112 MMHG | HEART RATE: 74 BPM | RESPIRATION RATE: 16 BRPM | BODY MASS INDEX: 20.4 KG/M2 | TEMPERATURE: 97.4 F | OXYGEN SATURATION: 95 % | HEIGHT: 67 IN | DIASTOLIC BLOOD PRESSURE: 70 MMHG

## 2018-11-25 DIAGNOSIS — J30.9 ALLERGIC RHINITIS, UNSPECIFIED SEASONALITY, UNSPECIFIED TRIGGER: ICD-10-CM

## 2018-11-25 PROCEDURE — 99214 OFFICE O/P EST MOD 30 MIN: CPT | Performed by: PHYSICIAN ASSISTANT

## 2018-11-25 RX ORDER — DEXAMETHASONE SODIUM PHOSPHATE 10 MG/ML
10 INJECTION INTRAMUSCULAR; INTRAVENOUS ONCE
Status: COMPLETED | OUTPATIENT
Start: 2018-11-25 | End: 2018-11-25

## 2018-11-25 RX ADMIN — DEXAMETHASONE SODIUM PHOSPHATE 10 MG: 10 INJECTION INTRAMUSCULAR; INTRAVENOUS at 15:47

## 2018-11-25 NOTE — PROGRESS NOTES
Chief Complaint   Patient presents with   • Sinusitis     pressure/pain, x3d       HISTORY OF PRESENT ILLNESS: Patient is a 38 y.o. female who presents today because she has a one-week history of left-sided nasal and sinus congestion, now having it more on the right.  She has tried over-the-counter antihistamines, decongestants and anti-inflammatories without any significant improvement.  No fevers, chills, nausea, vomiting or diarrhea.    Patient Active Problem List    Diagnosis Date Noted   • Immunizations incomplete 10/08/2014       Allergies:Patient has no known allergies.    Current Outpatient Prescriptions Ordered in [facility]   Medication Sig Dispense Refill   • cyclobenzaprine (FLEXERIL) 5 MG tablet Take 1-2 Tabs by mouth 3 times a day as needed. 15 Tab 0   • cyclobenzaprine (FLEXERIL) 5 MG tablet Take 1-2 Tabs by mouth 3 times a day as needed. 30 Tab 0   • ibuprofen (MOTRIN) 200 MG Tab Take 200 mg by mouth every 6 hours as needed.     • Diclofenac Sodium 1 % Gel Apply 2-4 grams to affected area 4 times daily as needed for pain. 1 Tube 0   • cyclobenzaprine (FLEXERIL) 10 MG Tab Take 1 Tab by mouth 3 times a day as needed. 30 Tab 0     Current Facility-Administered Medications Ordered in Epic   Medication Dose Route Frequency Provider Last Rate Last Dose   • dexamethasone (DECADRON) injection (check route below) 10 mg  10 mg Intramuscular Once ANGIE Osorio.A.-C.           Past Medical History:   Diagnosis Date   • Immunizations incomplete 10/8/2014       Social History   Substance Use Topics   • Smoking status: Never Smoker   • Smokeless tobacco: Never Used   • Alcohol use 1.5 oz/week     3 Cans of beer per week       Family Status   Relation Status   • Mo Alive   • Fa Alive     Family History   Problem Relation Age of Onset   • Heart Disease Mother        ROS:  Review of Systems   Constitutional: Negative for fever, chills, weight loss and malaise/fatigue.   HENT: Negative for ear pain, nosebleeds, positive  "for nasal and sinus congestion, no sore throat and neck pain.    Eyes: Negative for blurred vision.   Respiratory: Negative for cough, sputum production, shortness of breath and wheezing.    Cardiovascular: Negative for chest pain, palpitations, orthopnea and leg swelling.   Gastrointestinal: Negative for heartburn, nausea, vomiting and abdominal pain.   Genitourinary: Negative for dysuria, urgency and frequency.     Exam:  Blood pressure 112/70, pulse 74, temperature 36.3 °C (97.4 °F), temperature source Temporal, resp. rate 16, height 1.702 m (5' 7\"), weight 59 kg (130 lb), SpO2 95 %, not currently breastfeeding.  General:  Well nourished, well developed female in NAD  Head:Normocephalic, atraumatic  Eyes: PERRLA, EOM within normal limits, no conjunctival injection, no scleral icterus, visual fields and acuity grossly intact.  Ears: Normal shape and symmetry, no tenderness, no discharge. External canals are without any significant edema or erythema. Tympanic membranes are without any inflammation, small amount of cloudy fluid behind the tympanic membranes bilaterally. Gross auditory acuity is intact  Nose: Symmetrical without tenderness, no discharge.  Nasal mucosa on the left is pale and edematous almost to the point of occlusion  Mouth: reasonable hygiene, no erythema exudates or tonsillar enlargement.  Neck: no masses, range of motion within normal limits, no tracheal deviation. No obvious thyroid enlargement.  Pulmonary: chest is symmetrical with respiration, no wheezes, crackles, or rhonchi.  Cardiovascular: regular rate and rhythm without murmurs, rubs, or gallops.  Extremities: no clubbing, cyanosis, or edema.    Please note that this dictation was created using voice recognition software. I have made every reasonable attempt to correct obvious errors, but I expect that there are errors of grammar and possibly content that I did not discover before finalizing the note.    Assessment/Plan:  1. Allergic " rhinitis, unspecified seasonality, unspecified trigger  dexamethasone (DECADRON) injection (check route below) 10 mg     May continue decongestants, nasal sprays as tolerated  Followup with primary care in the next 7-10 days if not significantly improving, return to the urgent care or go to the emergency room sooner for any worsening of symptoms.

## 2018-12-27 ENCOUNTER — OFFICE VISIT (OUTPATIENT)
Dept: URGENT CARE | Facility: PHYSICIAN GROUP | Age: 38
End: 2018-12-27
Payer: COMMERCIAL

## 2018-12-27 ENCOUNTER — HOSPITAL ENCOUNTER (OUTPATIENT)
Facility: MEDICAL CENTER | Age: 38
End: 2018-12-27
Attending: NURSE PRACTITIONER
Payer: COMMERCIAL

## 2018-12-27 VITALS
DIASTOLIC BLOOD PRESSURE: 76 MMHG | OXYGEN SATURATION: 99 % | SYSTOLIC BLOOD PRESSURE: 132 MMHG | RESPIRATION RATE: 16 BRPM | TEMPERATURE: 98.7 F | HEART RATE: 78 BPM

## 2018-12-27 DIAGNOSIS — R30.0 DYSURIA: ICD-10-CM

## 2018-12-27 DIAGNOSIS — N30.01 ACUTE CYSTITIS WITH HEMATURIA: ICD-10-CM

## 2018-12-27 PROCEDURE — 87086 URINE CULTURE/COLONY COUNT: CPT

## 2018-12-27 PROCEDURE — 99214 OFFICE O/P EST MOD 30 MIN: CPT | Performed by: NURSE PRACTITIONER

## 2018-12-27 RX ORDER — PHENAZOPYRIDINE HYDROCHLORIDE 200 MG/1
200 TABLET, FILM COATED ORAL 3 TIMES DAILY
Qty: 6 TAB | Refills: 0 | Status: SHIPPED | OUTPATIENT
Start: 2018-12-27 | End: 2018-12-29

## 2018-12-27 RX ORDER — NITROFURANTOIN 25; 75 MG/1; MG/1
100 CAPSULE ORAL EVERY 12 HOURS
Qty: 10 CAP | Refills: 0 | Status: SHIPPED | OUTPATIENT
Start: 2018-12-27 | End: 2019-01-01

## 2018-12-27 ASSESSMENT — ENCOUNTER SYMPTOMS
SWEATS: 0
HEADACHES: 0
COUGH: 0
BACK PAIN: 1
CHILLS: 0
NAUSEA: 0
VOMITING: 0
FLANK PAIN: 0

## 2018-12-27 ASSESSMENT — LIFESTYLE VARIABLES: SUBSTANCE_ABUSE: 0

## 2018-12-28 DIAGNOSIS — N30.01 ACUTE CYSTITIS WITH HEMATURIA: ICD-10-CM

## 2018-12-28 NOTE — PROGRESS NOTES
Subjective:      Leighann Wolf is a 38 y.o. female who presents with UTI          Denies past medical, surgical or family history that is significant to today's problem.   RX or OTC medications reviewed with patient today.   No Known Allergies      Dysuria    This is a new problem. The current episode started in the past 7 days. The problem occurs every urination. The problem has been waxing and waning. The quality of the pain is described as aching and burning. The pain is at a severity of 4/10. The pain is moderate. There has been no fever. She is sexually active. There is no history of pyelonephritis. Associated symptoms include frequency. Pertinent negatives include no chills, discharge, flank pain, hematuria, hesitancy, nausea, sweats, urgency or vomiting. Associated symptoms comments: Low back pain  . She has tried acetaminophen and increased fluids for the symptoms. The treatment provided mild relief.    this is a new problem.     Review of Systems   Constitutional: Negative for chills.   Respiratory: Negative for cough.    Gastrointestinal: Negative for nausea and vomiting.   Genitourinary: Positive for dysuria and frequency. Negative for flank pain, hematuria, hesitancy and urgency.        Denies vaginal discharge     Musculoskeletal: Positive for back pain.   Skin: Negative for rash.   Neurological: Negative for headaches.   Psychiatric/Behavioral: Negative for substance abuse.          Objective:     /76   Pulse 78   Temp 37.1 °C (98.7 °F)   Resp 16   SpO2 99%      Physical Exam   Constitutional: Vital signs are normal. She appears well-developed and well-nourished. She is cooperative.  Non-toxic appearance. She does not appear ill. No distress.   HENT:   Head: Normocephalic.   Cardiovascular: Normal rate and regular rhythm.    Pulmonary/Chest: Effort normal and breath sounds normal.   Abdominal: Soft. Normal appearance. She exhibits no distension. There is tenderness in the suprapubic area.  There is no rigidity, no rebound, no guarding and no CVA tenderness.   Musculoskeletal:        Lumbar back: Normal.   Neurological: She is alert.   Skin: Skin is warm and dry. She is not diaphoretic.   Nursing note and vitals reviewed.       UA: Pos leuk, neg nitrates    Pertinent previous visits related to previous urine cultures, Urinalysis and medication/ antibiotics , Lab  studies reviewed. (See  Epic chart)         Assessment/Plan:     1. Acute cystitis with hematuria  Urine Culture    nitrofurantoin monohydr macro (MACROBID) 100 MG Cap   2. Dysuria  POCT Urinalysis    phenazopyridine (PYRIDIUM) 200 MG Tab     Educated in proper administration of medication(s) ordered today including safety, possible SE, risks, benefits, rationale and alternatives to therapy.   Return to clinic or PCP PRN  if current symptoms are not resolving in a satisfactory manner or sooner if new or worsening symptoms occur.   Patient  advised differential diagnoses, signs and symptoms which would warrant further evaluation and /or emergent evaluation.    Patient was in agreement with this treatment plan and seemed to understand without barriers.   Questions were encouraged and answered to patients satisfaction.   Aftercare instructions given to pt/ caregiver. .

## 2018-12-30 ENCOUNTER — TELEPHONE (OUTPATIENT)
Dept: URGENT CARE | Facility: PHYSICIAN GROUP | Age: 38
End: 2018-12-30

## 2018-12-30 LAB
BACTERIA UR CULT: NORMAL
SIGNIFICANT IND 70042: NORMAL
SITE SITE: NORMAL
SOURCE SOURCE: NORMAL

## 2019-03-26 ENCOUNTER — OFFICE VISIT (OUTPATIENT)
Dept: URGENT CARE | Facility: PHYSICIAN GROUP | Age: 39
End: 2019-03-26
Payer: COMMERCIAL

## 2019-03-26 ENCOUNTER — HOSPITAL ENCOUNTER (OUTPATIENT)
Facility: MEDICAL CENTER | Age: 39
End: 2019-03-26
Attending: PHYSICIAN ASSISTANT
Payer: COMMERCIAL

## 2019-03-26 VITALS
DIASTOLIC BLOOD PRESSURE: 68 MMHG | RESPIRATION RATE: 16 BRPM | TEMPERATURE: 98.9 F | HEIGHT: 67 IN | SYSTOLIC BLOOD PRESSURE: 112 MMHG | WEIGHT: 130 LBS | OXYGEN SATURATION: 96 % | HEART RATE: 76 BPM | BODY MASS INDEX: 20.4 KG/M2

## 2019-03-26 DIAGNOSIS — K21.9 GASTROESOPHAGEAL REFLUX DISEASE WITHOUT ESOPHAGITIS: ICD-10-CM

## 2019-03-26 DIAGNOSIS — R10.30 LOWER ABDOMINAL PAIN: ICD-10-CM

## 2019-03-26 DIAGNOSIS — R14.0 BLOATING: ICD-10-CM

## 2019-03-26 DIAGNOSIS — R53.83 OTHER FATIGUE: ICD-10-CM

## 2019-03-26 LAB
APPEARANCE UR: CLEAR
BILIRUB UR STRIP-MCNC: NEGATIVE MG/DL
COLOR UR AUTO: YELLOW
GLUCOSE UR STRIP.AUTO-MCNC: NEGATIVE MG/DL
KETONES UR STRIP.AUTO-MCNC: NEGATIVE MG/DL
LEUKOCYTE ESTERASE UR QL STRIP.AUTO: NORMAL
NITRITE UR QL STRIP.AUTO: NEGATIVE
PH UR STRIP.AUTO: 6 [PH] (ref 5–8)
PROT UR QL STRIP: NEGATIVE MG/DL
RBC UR QL AUTO: NORMAL
SP GR UR STRIP.AUTO: 1
UROBILINOGEN UR STRIP-MCNC: NEGATIVE MG/DL

## 2019-03-26 PROCEDURE — 87086 URINE CULTURE/COLONY COUNT: CPT

## 2019-03-26 PROCEDURE — 87077 CULTURE AEROBIC IDENTIFY: CPT

## 2019-03-26 PROCEDURE — 99214 OFFICE O/P EST MOD 30 MIN: CPT | Performed by: PHYSICIAN ASSISTANT

## 2019-03-26 PROCEDURE — 81002 URINALYSIS NONAUTO W/O SCOPE: CPT | Performed by: PHYSICIAN ASSISTANT

## 2019-03-26 RX ORDER — OMEPRAZOLE 20 MG/1
20 CAPSULE, DELAYED RELEASE ORAL DAILY
Qty: 30 CAP | Refills: 3 | Status: SHIPPED
Start: 2019-03-26 | End: 2020-05-15

## 2019-03-26 NOTE — PROGRESS NOTES
Chief Complaint   Patient presents with   • Bloating       HISTORY OF PRESENT ILLNESS: Patient is a 38 y.o. female who presents today because she has a list of complaints which are primarily GI related.  She complains of a long history of gastroesophageal reflux, bloating, changes in stool to include bouts of diarrhea, intermixed with bouts of constipation.  She has not been taking any medications specifically for her symptoms other than some Tums on occasion but nothing on a regular basis.  She also complains of fatigue.  She denies any fevers, chills, has had occasional nausea, no vomiting.  She has intermittent left lower quadrant tenderness to palpation and tenderness when she coughs or sneezes.    Patient Active Problem List    Diagnosis Date Noted   • Immunizations incomplete 10/08/2014       Allergies:Patient has no known allergies.    Current Outpatient Prescriptions Ordered in [facility]   Medication Sig Dispense Refill   • omeprazole (PRILOSEC) 20 MG delayed-release capsule Take 1 Cap by mouth every day. 30 Cap 3   • cyclobenzaprine (FLEXERIL) 5 MG tablet Take 1-2 Tabs by mouth 3 times a day as needed. (Patient not taking: Reported on 12/27/2018) 15 Tab 0   • cyclobenzaprine (FLEXERIL) 5 MG tablet Take 1-2 Tabs by mouth 3 times a day as needed. (Patient not taking: Reported on 12/27/2018) 30 Tab 0   • ibuprofen (MOTRIN) 200 MG Tab Take 200 mg by mouth every 6 hours as needed.     • Diclofenac Sodium 1 % Gel Apply 2-4 grams to affected area 4 times daily as needed for pain. (Patient not taking: Reported on 12/27/2018) 1 Tube 0   • cyclobenzaprine (FLEXERIL) 10 MG Tab Take 1 Tab by mouth 3 times a day as needed. (Patient not taking: Reported on 12/27/2018) 30 Tab 0     No current Epic-ordered facility-administered medications on file.        Past Medical History:   Diagnosis Date   • Immunizations incomplete 10/8/2014       Social History   Substance Use Topics   • Smoking status: Never Smoker   • Smokeless  "tobacco: Never Used   • Alcohol use 1.5 oz/week     3 Cans of beer per week       Family Status   Relation Status   • Mo Alive   • Fa Alive     Family History   Problem Relation Age of Onset   • Heart Disease Mother        ROS:  Review of Systems   Constitutional: Negative for fever, chills, weight loss and positive for malaise/fatigue.   HENT: Negative for ear pain, nosebleeds, congestion, sore throat and neck pain.    Eyes: Negative for blurred vision.   Respiratory: Negative for cough, sputum production, shortness of breath and wheezing.    Cardiovascular: Negative for chest pain, palpitations, orthopnea and leg swelling.   Gastrointestinal: Positive for heartburn, nausea, no vomiting and positive for abdominal pain.   Genitourinary: Negative for dysuria, urgency and frequency.     Exam:  Blood pressure 112/68, pulse 76, temperature 37.2 °C (98.9 °F), temperature source Temporal, resp. rate 16, height 1.702 m (5' 7\"), weight 59 kg (130 lb), SpO2 96 %, not currently breastfeeding.  General:  Well nourished, well developed female in NAD  Head:Normocephalic, atraumatic  Eyes: PERRLA, EOM within normal limits, no conjunctival injection, no scleral icterus, visual fields and acuity grossly intact.  Ears: Normal shape and symmetry, no tenderness, no discharge. External canals are without any significant edema or erythema. Tympanic membranes are without any inflammation, no effusion. Gross auditory acuity is intact  Nose: Symmetrical without tenderness, no discharge.  Mouth: reasonable hygiene, no erythema exudates or tonsillar enlargement.  Neck: no masses, range of motion within normal limits, no tracheal deviation. No obvious thyroid enlargement.  Pulmonary: chest is symmetrical with respiration, no wheezes, crackles, or rhonchi.  Cardiovascular: regular rate and rhythm without murmurs, rubs, or gallops.  Abdomen: Nondistended, bowel tones in all 4 quadrants, soft, erratic generalized tenderness to palpation without " any Mcgraw's or McBurney's point tenderness, no rebound referred rebound tenderness.  Extremities: no clubbing, cyanosis, or edema.    Please note that this dictation was created using voice recognition software. I have made every reasonable attempt to correct obvious errors, but I expect that there are errors of grammar and possibly content that I did not discover before finalizing the note.    Assessment/Plan:  1. Gastroesophageal reflux disease without esophagitis  omeprazole (PRILOSEC) 20 MG delayed-release capsule   2. Bloating  POCT Urinalysis    URINE CULTURE(NEW)    CBC WITH DIFFERENTIAL    Comp Metabolic Panel    AMYLASE    LIPASE   3. Lower abdominal pain  POCT Urinalysis    URINE CULTURE(NEW)    CBC WITH DIFFERENTIAL    Comp Metabolic Panel   4. Other fatigue  Lipid Profile    TSH+FREE T4   I will see this patient back in the next 2 months to further evaluate and possibly refer to GI if needed    Followup with primary care in the next 7-10 days if not significantly improving, return to the urgent care or go to the emergency room sooner for any worsening of symptoms.

## 2019-03-27 ENCOUNTER — HOSPITAL ENCOUNTER (OUTPATIENT)
Dept: LAB | Facility: MEDICAL CENTER | Age: 39
End: 2019-03-27
Attending: PHYSICIAN ASSISTANT
Payer: COMMERCIAL

## 2019-03-27 DIAGNOSIS — R14.0 BLOATING: ICD-10-CM

## 2019-03-27 DIAGNOSIS — R53.83 OTHER FATIGUE: ICD-10-CM

## 2019-03-27 DIAGNOSIS — R10.30 LOWER ABDOMINAL PAIN: ICD-10-CM

## 2019-03-27 LAB
ALBUMIN SERPL BCP-MCNC: 4.2 G/DL (ref 3.2–4.9)
ALBUMIN/GLOB SERPL: 1.6 G/DL
ALP SERPL-CCNC: 52 U/L (ref 30–99)
ALT SERPL-CCNC: 14 U/L (ref 2–50)
AMYLASE SERPL-CCNC: 44 U/L (ref 20–103)
ANION GAP SERPL CALC-SCNC: 8 MMOL/L (ref 0–11.9)
AST SERPL-CCNC: 16 U/L (ref 12–45)
BASOPHILS # BLD AUTO: 0.4 % (ref 0–1.8)
BASOPHILS # BLD: 0.03 K/UL (ref 0–0.12)
BILIRUB SERPL-MCNC: 1.5 MG/DL (ref 0.1–1.5)
BUN SERPL-MCNC: 13 MG/DL (ref 8–22)
CALCIUM SERPL-MCNC: 8.9 MG/DL (ref 8.5–10.5)
CHLORIDE SERPL-SCNC: 108 MMOL/L (ref 96–112)
CHOLEST SERPL-MCNC: 162 MG/DL (ref 100–199)
CO2 SERPL-SCNC: 24 MMOL/L (ref 20–33)
CREAT SERPL-MCNC: 0.55 MG/DL (ref 0.5–1.4)
EOSINOPHIL # BLD AUTO: 0.12 K/UL (ref 0–0.51)
EOSINOPHIL NFR BLD: 1.6 % (ref 0–6.9)
ERYTHROCYTE [DISTWIDTH] IN BLOOD BY AUTOMATED COUNT: 40.8 FL (ref 35.9–50)
FASTING STATUS PATIENT QL REPORTED: NORMAL
GLOBULIN SER CALC-MCNC: 2.7 G/DL (ref 1.9–3.5)
GLUCOSE SERPL-MCNC: 88 MG/DL (ref 65–99)
HCT VFR BLD AUTO: 45.1 % (ref 37–47)
HDLC SERPL-MCNC: 47 MG/DL
HGB BLD-MCNC: 15.1 G/DL (ref 12–16)
IMM GRANULOCYTES # BLD AUTO: 0.03 K/UL (ref 0–0.11)
IMM GRANULOCYTES NFR BLD AUTO: 0.4 % (ref 0–0.9)
LDLC SERPL CALC-MCNC: 101 MG/DL
LIPASE SERPL-CCNC: 43 U/L (ref 11–82)
LYMPHOCYTES # BLD AUTO: 1.99 K/UL (ref 1–4.8)
LYMPHOCYTES NFR BLD: 25.7 % (ref 22–41)
MCH RBC QN AUTO: 29.3 PG (ref 27–33)
MCHC RBC AUTO-ENTMCNC: 33.5 G/DL (ref 33.6–35)
MCV RBC AUTO: 87.6 FL (ref 81.4–97.8)
MONOCYTES # BLD AUTO: 0.23 K/UL (ref 0–0.85)
MONOCYTES NFR BLD AUTO: 3 % (ref 0–13.4)
NEUTROPHILS # BLD AUTO: 5.33 K/UL (ref 2–7.15)
NEUTROPHILS NFR BLD: 68.9 % (ref 44–72)
NRBC # BLD AUTO: 0 K/UL
NRBC BLD-RTO: 0 /100 WBC
PLATELET # BLD AUTO: 312 K/UL (ref 164–446)
PMV BLD AUTO: 9.6 FL (ref 9–12.9)
POTASSIUM SERPL-SCNC: 4.3 MMOL/L (ref 3.6–5.5)
PROT SERPL-MCNC: 6.9 G/DL (ref 6–8.2)
RBC # BLD AUTO: 5.15 M/UL (ref 4.2–5.4)
SODIUM SERPL-SCNC: 140 MMOL/L (ref 135–145)
T4 FREE SERPL-MCNC: 1.14 NG/DL (ref 0.53–1.43)
TRIGL SERPL-MCNC: 71 MG/DL (ref 0–149)
TSH SERPL DL<=0.005 MIU/L-ACNC: 3.06 UIU/ML (ref 0.38–5.33)
WBC # BLD AUTO: 7.7 K/UL (ref 4.8–10.8)

## 2019-03-27 PROCEDURE — 83690 ASSAY OF LIPASE: CPT

## 2019-03-27 PROCEDURE — 36415 COLL VENOUS BLD VENIPUNCTURE: CPT

## 2019-03-27 PROCEDURE — 80061 LIPID PANEL: CPT

## 2019-03-27 PROCEDURE — 82150 ASSAY OF AMYLASE: CPT

## 2019-03-27 PROCEDURE — 85025 COMPLETE CBC W/AUTO DIFF WBC: CPT

## 2019-03-27 PROCEDURE — 80053 COMPREHEN METABOLIC PANEL: CPT

## 2019-03-27 PROCEDURE — 84443 ASSAY THYROID STIM HORMONE: CPT

## 2019-03-27 PROCEDURE — 84439 ASSAY OF FREE THYROXINE: CPT

## 2019-03-28 DIAGNOSIS — N30.00 ACUTE CYSTITIS WITHOUT HEMATURIA: ICD-10-CM

## 2019-03-28 RX ORDER — AMOXICILLIN AND CLAVULANATE POTASSIUM 875; 125 MG/1; MG/1
1 TABLET, FILM COATED ORAL 2 TIMES DAILY
Qty: 20 TAB | Refills: 0 | Status: SHIPPED | OUTPATIENT
Start: 2019-03-28 | End: 2019-04-07

## 2019-04-02 ENCOUNTER — TELEPHONE (OUTPATIENT)
Dept: URGENT CARE | Facility: PHYSICIAN GROUP | Age: 39
End: 2019-04-02

## 2019-06-27 ENCOUNTER — OFFICE VISIT (OUTPATIENT)
Dept: URGENT CARE | Facility: PHYSICIAN GROUP | Age: 39
End: 2019-06-27
Payer: COMMERCIAL

## 2019-06-27 ENCOUNTER — HOSPITAL ENCOUNTER (OUTPATIENT)
Facility: MEDICAL CENTER | Age: 39
End: 2019-06-27
Attending: FAMILY MEDICINE
Payer: COMMERCIAL

## 2019-06-27 VITALS
RESPIRATION RATE: 14 BRPM | SYSTOLIC BLOOD PRESSURE: 128 MMHG | OXYGEN SATURATION: 95 % | DIASTOLIC BLOOD PRESSURE: 74 MMHG | WEIGHT: 132 LBS | TEMPERATURE: 98.9 F | HEART RATE: 72 BPM | BODY MASS INDEX: 20.67 KG/M2

## 2019-06-27 DIAGNOSIS — N30.00 ACUTE CYSTITIS WITHOUT HEMATURIA: ICD-10-CM

## 2019-06-27 DIAGNOSIS — R35.0 URINARY FREQUENCY: ICD-10-CM

## 2019-06-27 LAB
APPEARANCE UR: CLEAR
BILIRUB UR STRIP-MCNC: NORMAL MG/DL
COLOR UR AUTO: YELLOW
GLUCOSE UR STRIP.AUTO-MCNC: NORMAL MG/DL
KETONES UR STRIP.AUTO-MCNC: NORMAL MG/DL
LEUKOCYTE ESTERASE UR QL STRIP.AUTO: NORMAL
NITRITE UR QL STRIP.AUTO: NORMAL
PH UR STRIP.AUTO: 6 [PH] (ref 5–8)
PROT UR QL STRIP: NORMAL MG/DL
RBC UR QL AUTO: NORMAL
SP GR UR STRIP.AUTO: 1.02
UROBILINOGEN UR STRIP-MCNC: 0.2 MG/DL

## 2019-06-27 PROCEDURE — 87086 URINE CULTURE/COLONY COUNT: CPT

## 2019-06-27 PROCEDURE — 81002 URINALYSIS NONAUTO W/O SCOPE: CPT | Performed by: FAMILY MEDICINE

## 2019-06-27 PROCEDURE — 99213 OFFICE O/P EST LOW 20 MIN: CPT | Performed by: FAMILY MEDICINE

## 2019-06-27 RX ORDER — NITROFURANTOIN 25; 75 MG/1; MG/1
100 CAPSULE ORAL EVERY 12 HOURS
Qty: 10 CAP | Refills: 0 | Status: SHIPPED | OUTPATIENT
Start: 2019-06-27 | End: 2019-07-02

## 2019-06-27 ASSESSMENT — ENCOUNTER SYMPTOMS
FEVER: 0
VOMITING: 0
SHORTNESS OF BREATH: 0

## 2019-06-27 NOTE — PROGRESS NOTES
Subjective:     Leighann Wolf is a 39 y.o. female who presents for Urinary Frequency and Back Pain (x 1 week)    HPI  Pt presents for evaluation of a recurrent problem  Pt with urinary frequency for the past 1 week  Has history of frequent urinary tract infections and this feels similar  Having some intermittent back pain   Drinks increased water trying to improve UTI symptoms on her own, but only feels this makes her urinate more frequently  No dysuria, hesitancy, or incomplete voiding    Review of Systems   Constitutional: Negative for fever.   Respiratory: Negative for shortness of breath.    Cardiovascular: Negative for chest pain.   Gastrointestinal: Negative for vomiting.   Genitourinary: Positive for frequency and urgency.   Skin: Negative for rash.       PMH:  has a past medical history of Immunizations incomplete (10/8/2014).  MEDS:   Current Outpatient Prescriptions:   •  omeprazole (PRILOSEC) 20 MG delayed-release capsule, Take 1 Cap by mouth every day. (Patient not taking: Reported on 6/27/2019), Disp: 30 Cap, Rfl: 3  •  cyclobenzaprine (FLEXERIL) 5 MG tablet, Take 1-2 Tabs by mouth 3 times a day as needed. (Patient not taking: Reported on 12/27/2018), Disp: 15 Tab, Rfl: 0  •  cyclobenzaprine (FLEXERIL) 5 MG tablet, Take 1-2 Tabs by mouth 3 times a day as needed. (Patient not taking: Reported on 12/27/2018), Disp: 30 Tab, Rfl: 0  •  ibuprofen (MOTRIN) 200 MG Tab, Take 200 mg by mouth every 6 hours as needed., Disp: , Rfl:   •  Diclofenac Sodium 1 % Gel, Apply 2-4 grams to affected area 4 times daily as needed for pain. (Patient not taking: Reported on 12/27/2018), Disp: 1 Tube, Rfl: 0  •  cyclobenzaprine (FLEXERIL) 10 MG Tab, Take 1 Tab by mouth 3 times a day as needed. (Patient not taking: Reported on 12/27/2018), Disp: 30 Tab, Rfl: 0  ALLERGIES: No Known Allergies  SURGHX: No past surgical history on file.  SOCHX:  reports that she has never smoked. She has never used smokeless tobacco. She reports  that she drinks about 1.5 oz of alcohol per week . She reports that she does not use drugs.     Objective:   /74   Pulse 72   Temp 37.2 °C (98.9 °F) (Temporal)   Resp 14   Wt 59.9 kg (132 lb)   SpO2 95%   BMI 20.67 kg/m²     Physical Exam   Constitutional: She is oriented to person, place, and time. She appears well-developed and well-nourished. No distress.   HENT:   Head: Normocephalic and atraumatic.   Abdominal: Soft. Bowel sounds are normal. She exhibits no distension and no mass. There is no rebound and no guarding.   +Mild tenderness to palpation in suprapubic area  No CVA tenderness   Neurological: She is alert and oriented to person, place, and time. No sensory deficit.   Skin: Skin is warm and dry. She is not diaphoretic. No erythema.   Psychiatric: She has a normal mood and affect. Her behavior is normal. Judgment and thought content normal.       Assessment/Plan:   Assessment    1. Urinary frequency  2. Acute cystitis without hematuria  Patient is a 39-year-old female with urinary frequency and acute cystitis.  Will treat acute cystitis with Macrobid and send urine for culture.  Because this is a recurrent problem, did offer referral to urology and patient would like to be evaluated by specialist.  Referral made today.  Follow-up with urology.  - Urine Culture; Future  - nitrofurantoin monohyd macro (MACROBID) 100 MG Cap; Take 1 Cap by mouth every 12 hours for 5 days.  Dispense: 10 Cap; Refill: 0  - REFERRAL TO UROLOGY

## 2019-06-30 LAB
BACTERIA UR CULT: NORMAL
SIGNIFICANT IND 70042: NORMAL
SITE SITE: NORMAL
SOURCE SOURCE: NORMAL

## 2019-09-06 ENCOUNTER — HOSPITAL ENCOUNTER (OUTPATIENT)
Dept: RADIOLOGY | Facility: MEDICAL CENTER | Age: 39
End: 2019-09-06
Attending: PHYSICIAN ASSISTANT
Payer: COMMERCIAL

## 2019-09-06 DIAGNOSIS — N39.0 URINARY TRACT INFECTION WITHOUT HEMATURIA, SITE UNSPECIFIED: ICD-10-CM

## 2019-09-06 PROCEDURE — 76775 US EXAM ABDO BACK WALL LIM: CPT

## 2020-05-15 ENCOUNTER — OFFICE VISIT (OUTPATIENT)
Dept: URGENT CARE | Facility: PHYSICIAN GROUP | Age: 40
End: 2020-05-15
Payer: COMMERCIAL

## 2020-05-15 ENCOUNTER — APPOINTMENT (OUTPATIENT)
Dept: RADIOLOGY | Facility: IMAGING CENTER | Age: 40
End: 2020-05-15
Attending: NURSE PRACTITIONER
Payer: COMMERCIAL

## 2020-05-15 VITALS
TEMPERATURE: 98.6 F | DIASTOLIC BLOOD PRESSURE: 78 MMHG | OXYGEN SATURATION: 97 % | RESPIRATION RATE: 16 BRPM | WEIGHT: 128 LBS | HEIGHT: 67 IN | BODY MASS INDEX: 20.09 KG/M2 | HEART RATE: 74 BPM | SYSTOLIC BLOOD PRESSURE: 130 MMHG

## 2020-05-15 DIAGNOSIS — M54.2 NECK AND SHOULDER PAIN: ICD-10-CM

## 2020-05-15 DIAGNOSIS — M25.519 NECK AND SHOULDER PAIN: ICD-10-CM

## 2020-05-15 PROCEDURE — 72040 X-RAY EXAM NECK SPINE 2-3 VW: CPT | Mod: TC | Performed by: NURSE PRACTITIONER

## 2020-05-15 PROCEDURE — 99214 OFFICE O/P EST MOD 30 MIN: CPT | Performed by: NURSE PRACTITIONER

## 2020-05-15 RX ORDER — CYCLOBENZAPRINE HCL 5 MG
5-10 TABLET ORAL 3 TIMES DAILY PRN
Qty: 30 TAB | Refills: 0 | Status: SHIPPED | OUTPATIENT
Start: 2020-05-15 | End: 2021-09-03

## 2020-05-15 RX ORDER — ACETAMINOPHEN 500 MG
500-1000 TABLET ORAL EVERY 6 HOURS PRN
COMMUNITY
End: 2022-11-04

## 2020-05-15 ASSESSMENT — ENCOUNTER SYMPTOMS
PALPITATIONS: 0
ORTHOPNEA: 0
HEADACHES: 1
TINGLING: 0
WHEEZING: 0
MEMORY LOSS: 0
NUMBNESS: 0
VOMITING: 0
COUGH: 0
WEAKNESS: 0
CHILLS: 0
SYNCOPE: 0
FOCAL WEAKNESS: 0
SENSORY CHANGE: 0
LEG PAIN: 0
DIZZINESS: 0
FALLS: 0
CONSTIPATION: 0
SHORTNESS OF BREATH: 0
MYALGIAS: 0
FEVER: 0
NAUSEA: 0
SORE THROAT: 0
DIARRHEA: 0
HEARTBURN: 0
NECK PAIN: 1
WEIGHT LOSS: 0
BACK PAIN: 1

## 2020-05-15 ASSESSMENT — FIBROSIS 4 INDEX: FIB4 SCORE: 0.52

## 2020-05-15 ASSESSMENT — PAIN SCALES - GENERAL: PAINLEVEL: 7=MODERATE-SEVERE PAIN

## 2020-05-15 NOTE — LETTER
May 15, 2020         Patient: Leighann Wolf   YOB: 1980   Date of Visit: 5/15/2020           To Whom it May Concern:    Leighann Wolf was seen in my clinic on 5/15/2020. She may return to work on 5/15/2020.    If you have any questions or concerns, please don't hesitate to call.        Sincerely,           ANY Jackson.  Electronically Signed

## 2020-05-15 NOTE — PROGRESS NOTES
Subjective:      Leighann Wolf is a 40 y.o. female who presents with Back Pain (pain left shoulder and left side of the neck, has migranes because of the pain.)            Comes in with complaint of chronic back pain mainly in the thoracic spine as well as neck and shoulder pain.  She works as a CNA and reports that she cannot recall any specific injury but that she does do a lot of lifting and moving of people.  She feels over the last several months her neck and shoulder pain have worsened.  Symptoms appear to be worse on the left mid back and travel up into the neck.  She also has complaint of chronic hip pain which has been ongoing for several years which she has not been evaluated for.  We have discussed referral to PCP to get established and follow-up on this chronic complaint.  She verbalized understanding    Neck Pain    This is a new problem. The current episode started more than 1 month ago. The problem occurs daily. The problem has been gradually worsening. The pain is associated with an unknown factor. The quality of the pain is described as aching. The pain is at a severity of 5/10. The pain is moderate. The symptoms are aggravated by twisting and position. Associated symptoms include headaches. Pertinent negatives include no chest pain, fever, leg pain, numbness, pain with swallowing, syncope, tingling, weakness or weight loss. She has tried acetaminophen and NSAIDs for the symptoms. The treatment provided no relief.       Review of Systems   Constitutional: Negative for chills, fever, malaise/fatigue and weight loss.   HENT: Negative for ear pain and sore throat.    Respiratory: Negative for cough, shortness of breath and wheezing.    Cardiovascular: Negative for chest pain, palpitations, orthopnea, leg swelling and syncope.   Gastrointestinal: Negative for constipation, diarrhea, heartburn, nausea and vomiting.   Musculoskeletal: Positive for back pain and neck pain. Negative for falls, joint pain  "and myalgias.   Skin: Negative for rash.   Neurological: Positive for headaches. Negative for dizziness, tingling, sensory change, focal weakness, weakness and numbness.   Psychiatric/Behavioral: Negative for memory loss and suicidal ideas.   All other systems reviewed and are negative.         Objective:     /78   Pulse 74   Temp 37 °C (98.6 °F) (Temporal)   Resp 16   Ht 1.702 m (5' 7\")   Wt 58.1 kg (128 lb)   SpO2 97%   BMI 20.05 kg/m²      Physical Exam  Vitals signs reviewed.   Constitutional:       Appearance: Normal appearance.   HENT:      Head: Normocephalic.      Right Ear: External ear normal.      Left Ear: External ear normal.      Nose: Nose normal. No congestion.      Mouth/Throat:      Mouth: Mucous membranes are moist.   Eyes:      Extraocular Movements: Extraocular movements intact.      Conjunctiva/sclera: Conjunctivae normal.   Neck:      Musculoskeletal: Normal range of motion.   Cardiovascular:      Rate and Rhythm: Normal rate and regular rhythm.      Pulses: Normal pulses.      Heart sounds: Normal heart sounds. No murmur.   Pulmonary:      Effort: Pulmonary effort is normal.      Breath sounds: Normal breath sounds. No wheezing.   Abdominal:      General: Abdomen is flat.      Palpations: Abdomen is soft.   Musculoskeletal: Normal range of motion.      Cervical back: She exhibits tenderness, pain and spasm. She exhibits normal range of motion, no bony tenderness, no swelling and no edema.        Back:    Lymphadenopathy:      Cervical: No cervical adenopathy.   Skin:     General: Skin is warm and dry.      Capillary Refill: Capillary refill takes less than 2 seconds.   Neurological:      General: No focal deficit present.      Mental Status: She is alert and oriented to person, place, and time.      Cranial Nerves: No cranial nerve deficit.      Sensory: No sensory deficit.      Motor: No weakness.      Coordination: Coordination normal.      Gait: Gait normal.      Deep Tendon " Reflexes: Reflexes normal.   Psychiatric:         Mood and Affect: Mood normal.         Behavior: Behavior normal.       5/15/2020 12:15 PM     HISTORY/REASON FOR EXAM:  Atraumatic neck pain        TECHNIQUE/EXAM DESCRIPTION AND NUMBER OF VIEWS:  Cervical spine series, 2 views.     COMPARISON:  None.        FINDINGS:  The alignment of the cervical spine is mildly straightened but otherwise within normal limits. There is no subluxation.     The vertebral body heights are maintained.     The intervertebral disk spaces are maintained.     There is no arthropathy.     Prevertebral soft tissues and lung apices are clear.     IMPRESSION:     1.  Unremarkable cervical spine series.          Assessment/Plan:       1. Neck and shoulder pain  Patient given conservative care instructions (NSAIDs, stretching, warm & cold compresses, OTC oral and topical analgesics, and avoiding aggravating factors) as well as instructions for gentle ROM.    Patient with many chronic back issues referred to establish with PCP for further future follow up.    - DX-CERVICAL SPINE-2 OR 3 VIEWS; Future  - cyclobenzaprine (FLEXERIL) 5 MG tablet; Take 1-2 Tabs by mouth 3 times a day as needed.  Dispense: 30 Tab; Refill: 0  - REFERRAL TO FOLLOW-UP WITH PRIMARY CARE  - REFERRAL TO PHYSICAL THERAPY Reason for Therapy: Eval/Treat/Report

## 2020-12-21 ENCOUNTER — OFFICE VISIT (OUTPATIENT)
Dept: URGENT CARE | Facility: PHYSICIAN GROUP | Age: 40
End: 2020-12-21
Payer: COMMERCIAL

## 2020-12-21 VITALS
RESPIRATION RATE: 16 BRPM | OXYGEN SATURATION: 98 % | HEIGHT: 67 IN | WEIGHT: 135 LBS | TEMPERATURE: 97 F | SYSTOLIC BLOOD PRESSURE: 116 MMHG | BODY MASS INDEX: 21.19 KG/M2 | HEART RATE: 92 BPM | DIASTOLIC BLOOD PRESSURE: 78 MMHG

## 2020-12-21 DIAGNOSIS — J06.9 UPPER RESPIRATORY TRACT INFECTION, UNSPECIFIED TYPE: ICD-10-CM

## 2020-12-21 LAB
FLUAV+FLUBV AG SPEC QL IA: NEGATIVE
INT CON NEG: NEGATIVE
INT CON POS: POSITIVE

## 2020-12-21 PROCEDURE — 99214 OFFICE O/P EST MOD 30 MIN: CPT | Performed by: PHYSICIAN ASSISTANT

## 2020-12-21 PROCEDURE — 87804 INFLUENZA ASSAY W/OPTIC: CPT | Performed by: PHYSICIAN ASSISTANT

## 2020-12-21 ASSESSMENT — FIBROSIS 4 INDEX: FIB4 SCORE: 0.52

## 2020-12-21 NOTE — PROGRESS NOTES
Chief Complaint   Patient presents with   • Headache     fatigue   • Nausea       HISTORY OF PRESENT ILLNESS: Patient is a 40 y.o. female who presents today because she has a 1 to 2-day history of headache, fatigue and nausea.  She has been taking over-the-counter medications with minimal improvement.  She incidentally had a Covid test today which was negative  Patient Active Problem List    Diagnosis Date Noted   • Immunizations incomplete 10/08/2014       Allergies:Patient has no known allergies.    Current Outpatient Medications Ordered in Epic   Medication Sig Dispense Refill   • acetaminophen (TYLENOL) 500 MG Tab Take 500-1,000 mg by mouth every 6 hours as needed.     • cyclobenzaprine (FLEXERIL) 5 MG tablet Take 1-2 Tabs by mouth 3 times a day as needed. 30 Tab 0   • ibuprofen (MOTRIN) 200 MG Tab Take 200 mg by mouth every 6 hours as needed.       No current Epic-ordered facility-administered medications on file.        Past Medical History:   Diagnosis Date   • Immunizations incomplete 10/8/2014       Social History     Tobacco Use   • Smoking status: Never Smoker   • Smokeless tobacco: Never Used   Substance Use Topics   • Alcohol use: Not Currently     Alcohol/week: 1.5 oz     Types: 3 Cans of beer per week     Comment: quit back in 2015   • Drug use: Yes     Types: Marijuana, Inhaled       Family Status   Relation Name Status   • Mo  Alive   • Fa  Alive     Family History   Problem Relation Age of Onset   • Heart Disease Mother        ROS:  Review of Systems   Constitutional: Negative for fever, chills, positive for myalgias and malaise/fatigue.   HENT: Negative for ear pain, nosebleeds, positive for nasal congestion, no sore throat and neck pain.    Eyes: Negative for blurred vision.   Respiratory: Positive for mild cough, no sputum production, shortness of breath and wheezing.    Cardiovascular: Negative for chest pain, palpitations, orthopnea and leg swelling.   Gastrointestinal: Negative for heartburn,  "nausea, vomiting and abdominal pain.   Genitourinary: Negative for dysuria, urgency and frequency.     Exam:  /78 (BP Location: Right arm, Patient Position: Sitting, BP Cuff Size: Small adult)   Pulse 92   Temp 36.1 °C (97 °F) (Temporal)   Resp 16   Ht 1.702 m (5' 7\")   Wt 61.2 kg (135 lb)   SpO2 98%   General:  Well nourished, well developed female in NAD  Head:Normocephalic, atraumatic  Eyes: PERRLA, EOM within normal limits, no conjunctival injection, no scleral icterus, visual fields and acuity grossly intact.  Ears: Normal shape and symmetry, no tenderness, no discharge. External canals are without any significant edema or erythema. Tympanic membranes are without any inflammation, no effusion. Gross auditory acuity is intact  Nose: Symmetrical without tenderness, no discharge.  Mouth: reasonable hygiene, no erythema exudates or tonsillar enlargement.  Neck: no masses, range of motion within normal limits, no tracheal deviation. No obvious thyroid enlargement.  Pulmonary: chest is symmetrical with respiration, no wheezes, crackles, or rhonchi.  Cardiovascular: regular rate and rhythm without murmurs, rubs, or gallops.  Extremities: no clubbing, cyanosis, or edema.    Influenza test is negative    Please note that this dictation was created using voice recognition software. I have made every reasonable attempt to correct obvious errors, but I expect that there are errors of grammar and possibly content that I did not discover before finalizing the note.    Assessment/Plan:  1. Upper respiratory tract infection, unspecified type  POCT Influenza A/B   Discussed strict isolation until Covid test returns, over-the-counter symptomatic relief as needed    Followup with primary care in the next 7-10 days if not significantly improving, return to the urgent care or go to the emergency room sooner for any worsening of symptoms.       "

## 2021-05-24 ENCOUNTER — TELEPHONE (OUTPATIENT)
Dept: SCHEDULING | Facility: IMAGING CENTER | Age: 41
End: 2021-05-24

## 2021-07-07 ENCOUNTER — OFFICE VISIT (OUTPATIENT)
Dept: MEDICAL GROUP | Facility: PHYSICIAN GROUP | Age: 41
End: 2021-07-07
Payer: COMMERCIAL

## 2021-07-07 VITALS
DIASTOLIC BLOOD PRESSURE: 80 MMHG | HEIGHT: 67 IN | BODY MASS INDEX: 20.88 KG/M2 | WEIGHT: 133 LBS | TEMPERATURE: 98.6 F | SYSTOLIC BLOOD PRESSURE: 112 MMHG | OXYGEN SATURATION: 98 % | HEART RATE: 62 BPM

## 2021-07-07 DIAGNOSIS — Z02.0 ENCOUNTER FOR SCHOOL HISTORY AND PHYSICAL EXAMINATION: ICD-10-CM

## 2021-07-07 PROCEDURE — 99395 PREV VISIT EST AGE 18-39: CPT | Performed by: FAMILY MEDICINE

## 2021-07-07 ASSESSMENT — PATIENT HEALTH QUESTIONNAIRE - PHQ9: CLINICAL INTERPRETATION OF PHQ2 SCORE: 0

## 2021-07-07 NOTE — PROGRESS NOTES
"Subjective:   Leighann Wolf is a 41 y.o. 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   Forms filled.   She will get TB testing at work.   Normal physical exam.   Immunizations up to date            Current medicines (including changes today)  Current Outpatient Medications   Medication Sig Dispense Refill   • acetaminophen (TYLENOL) 500 MG Tab Take 500-1,000 mg by mouth every 6 hours as needed.     • cyclobenzaprine (FLEXERIL) 5 MG tablet Take 1-2 Tabs by mouth 3 times a day as needed. 30 Tab 0   • ibuprofen (MOTRIN) 200 MG Tab Take 200 mg by mouth every 6 hours as needed.       No current facility-administered medications for this visit.     She  has a past medical history of Immunizations incomplete (10/8/2014).    ROS  No chest pain, no shortness of breath, no abdominal pain       Objective:     /80   Pulse 62   Temp 37 °C (98.6 °F)   Ht 1.702 m (5' 7\")   Wt 60.3 kg (133 lb)   SpO2 98%  Body mass index is 20.83 kg/m².   Physical Exam:  Constitutional: Alert, no distress.  Skin: Warm, dry, good turgor, no rashes in visible areas.  Eye: Equal, round and reactive, conjunctiva clear, lids normal.  ENMT: Lips without lesions, good dentition, oropharynx clear.  Neck: Trachea midline, no masses, no thyromegaly. No cervical or supraclavicular lymphadenopathy  Respiratory: Unlabored respiratory effort, lungs clear to auscultation, no wheezes, no ronchi.  Cardiovascular: Normal S1, S2, no murmur, no edema.  Abdomen: Soft, non-tender, no masses, no hepatosplenomegaly.  Psych: Alert and oriented x3, normal affect and mood.        Assessment and Plan:   The following treatment plan was discussed    1. Encounter for school history and physical examination  Paperwork done.       Followup: Return for pls print immz form for her from web [employer].         "

## 2021-07-07 NOTE — ASSESSMENT & PLAN NOTE
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   Forms filled.   She will get TB testing at work.   Normal physical exam.   Immunizations up to date

## 2021-08-04 ENCOUNTER — HOSPITAL ENCOUNTER (OUTPATIENT)
Dept: LAB | Facility: MEDICAL CENTER | Age: 41
End: 2021-08-04
Attending: FAMILY MEDICINE
Payer: COMMERCIAL

## 2021-08-04 ENCOUNTER — TELEPHONE (OUTPATIENT)
Dept: MEDICAL GROUP | Facility: PHYSICIAN GROUP | Age: 41
End: 2021-08-04

## 2021-08-04 DIAGNOSIS — Z02.0 SCHOOL HEALTH EXAMINATION: ICD-10-CM

## 2021-08-04 LAB — HBV CORE IGM SER QL: NORMAL

## 2021-08-04 PROCEDURE — 86787 VARICELLA-ZOSTER ANTIBODY: CPT

## 2021-08-04 PROCEDURE — 36415 COLL VENOUS BLD VENIPUNCTURE: CPT

## 2021-08-04 PROCEDURE — 86705 HEP B CORE ANTIBODY IGM: CPT

## 2021-08-04 NOTE — TELEPHONE ENCOUNTER
Patient is starting school next week and needs Hep B / Varicella titer.    Can you please order and patient will go into [facility] to complete today

## 2021-08-05 ENCOUNTER — TELEPHONE (OUTPATIENT)
Dept: MEDICAL GROUP | Facility: PHYSICIAN GROUP | Age: 41
End: 2021-08-05

## 2021-08-05 LAB — VZV IGG SER IA-ACNC: 2.3

## 2021-08-05 NOTE — TELEPHONE ENCOUNTER
Phone Number Called: [phone] (home)       Call outcome: Spoke to patient regarding message below.    Message: Called to inform patient that she is immune to varicella but not to hep b, needs to do hep b vaccine series

## 2021-08-20 ENCOUNTER — NON-PROVIDER VISIT (OUTPATIENT)
Dept: MEDICAL GROUP | Facility: PHYSICIAN GROUP | Age: 41
End: 2021-08-20
Payer: COMMERCIAL

## 2021-08-20 ENCOUNTER — HOSPITAL ENCOUNTER (OUTPATIENT)
Dept: LAB | Facility: MEDICAL CENTER | Age: 41
End: 2021-08-20
Attending: NURSE PRACTITIONER
Payer: COMMERCIAL

## 2021-08-20 DIAGNOSIS — Z02.0 ENCOUNTER FOR SCHOOL HISTORY AND PHYSICAL EXAMINATION: ICD-10-CM

## 2021-08-20 DIAGNOSIS — Z23 NEED FOR HEPATITIS B VACCINATION: ICD-10-CM

## 2021-08-20 PROCEDURE — 36415 COLL VENOUS BLD VENIPUNCTURE: CPT

## 2021-08-20 PROCEDURE — 90746 HEPB VACCINE 3 DOSE ADULT IM: CPT | Performed by: NURSE PRACTITIONER

## 2021-08-20 PROCEDURE — 90471 IMMUNIZATION ADMIN: CPT | Performed by: NURSE PRACTITIONER

## 2021-08-20 PROCEDURE — 86762 RUBELLA ANTIBODY: CPT

## 2021-08-20 PROCEDURE — 86765 RUBEOLA ANTIBODY: CPT

## 2021-08-20 PROCEDURE — 86735 MUMPS ANTIBODY: CPT

## 2021-08-21 LAB
MEV IGG SER IA-ACNC: 2.17
MUV IGG SER IA-ACNC: 0.99
RUBV AB SER QL: 27.2 IU/ML

## 2021-09-03 ENCOUNTER — HOSPITAL ENCOUNTER (OUTPATIENT)
Facility: MEDICAL CENTER | Age: 41
End: 2021-09-03
Attending: NURSE PRACTITIONER
Payer: COMMERCIAL

## 2021-09-03 ENCOUNTER — OFFICE VISIT (OUTPATIENT)
Dept: MEDICAL GROUP | Facility: PHYSICIAN GROUP | Age: 41
End: 2021-09-03
Payer: COMMERCIAL

## 2021-09-03 ENCOUNTER — APPOINTMENT (OUTPATIENT)
Dept: MEDICAL GROUP | Facility: PHYSICIAN GROUP | Age: 41
End: 2021-09-03
Payer: COMMERCIAL

## 2021-09-03 VITALS
BODY MASS INDEX: 20.4 KG/M2 | DIASTOLIC BLOOD PRESSURE: 72 MMHG | RESPIRATION RATE: 12 BRPM | HEIGHT: 67 IN | WEIGHT: 130 LBS | HEART RATE: 71 BPM | TEMPERATURE: 98 F | SYSTOLIC BLOOD PRESSURE: 108 MMHG | OXYGEN SATURATION: 98 %

## 2021-09-03 DIAGNOSIS — Z30.42 ENCOUNTER FOR SURVEILLANCE OF INJECTABLE CONTRACEPTIVE: ICD-10-CM

## 2021-09-03 DIAGNOSIS — Z12.4 SCREENING FOR CERVICAL CANCER: ICD-10-CM

## 2021-09-03 DIAGNOSIS — Z11.51 SCREENING FOR HPV (HUMAN PAPILLOMAVIRUS): ICD-10-CM

## 2021-09-03 DIAGNOSIS — Z01.419 ENCOUNTER FOR GYNECOLOGICAL EXAMINATION: ICD-10-CM

## 2021-09-03 DIAGNOSIS — Z01.419 WELL WOMAN EXAM WITH ROUTINE GYNECOLOGICAL EXAM: ICD-10-CM

## 2021-09-03 PROCEDURE — 96372 THER/PROPH/DIAG INJ SC/IM: CPT | Performed by: NURSE PRACTITIONER

## 2021-09-03 PROCEDURE — 99395 PREV VISIT EST AGE 18-39: CPT | Mod: 25 | Performed by: NURSE PRACTITIONER

## 2021-09-03 RX ORDER — MEDROXYPROGESTERONE ACETATE 150 MG/ML
150 INJECTION, SUSPENSION INTRAMUSCULAR ONCE
Status: COMPLETED | OUTPATIENT
Start: 2021-09-03 | End: 2021-09-03

## 2021-09-03 RX ADMIN — MEDROXYPROGESTERONE ACETATE 150 MG: 150 INJECTION, SUSPENSION INTRAMUSCULAR at 10:20

## 2021-09-03 NOTE — PROGRESS NOTES
Chief Complaint   Patient presents with   • Gynecologic Exam   • Contraception     Depo injection [facility] stated she is due previous injeciton at [facility]        HISTORY OF PRESENT ILLNESS: Patient is a 41 y.o. female established patient who presents today for PAP, depo injection    Well woman exam with routine gynecological exam  Pt here for well woman exam including PAP/HPV. Her last PAP was well over 3 years ago and it was normal. She denies concerns for unusual vaginal discharge, odor, itching, or pain with intercourse. Her menses are absent in the setting of regular depo inhections   She does not have any concerns with her breasts. She is not up to date with her mammogram due to age. She does not do monthly self breast exams. She was encouraged to continue with monthly SBEs, instructions were given during exam.           Patient Active Problem List    Diagnosis Date Noted   • Well woman exam with routine gynecological exam 09/03/2021   • Encounter for surveillance of injectable contraceptive 09/03/2021   • Encounter for school history and physical examination 07/07/2021   • Immunizations incomplete 10/08/2014       Allergies:Patient has no known allergies.    Current Outpatient Medications   Medication Sig Dispense Refill   • acetaminophen (TYLENOL) 500 MG Tab Take 500-1,000 mg by mouth every 6 hours as needed.     • ibuprofen (MOTRIN) 200 MG Tab Take 200 mg by mouth every 6 hours as needed.       No current facility-administered medications for this visit.       Social History     Tobacco Use   • Smoking status: Never Smoker   • Smokeless tobacco: Never Used   Substance Use Topics   • Alcohol use: Not Currently     Alcohol/week: 1.5 oz     Types: 3 Cans of beer per week     Comment: quit back in 2015   • Drug use: Yes     Types: Marijuana, Inhaled       Family Status   Relation Name Status   • Mo  Alive   • Fa  Alive     Family History   Problem Relation Age of Onset   • Heart Disease Mother        Review of  "Systems:   Constitutional: Negative for fever, chills, weight loss and malaise/fatigue.   Respiratory: Negative for cough, sputum production, shortness of breath and wheezing.    Cardiovascular: Negative for chest pain, palpitations, orthopnea and leg swelling.   Gastrointestinal: Negative for heartburn, nausea, vomiting and abdominal pain.   Genitourinary/Renal: Negative for dysuria, urgency and frequency.   Musculoskeletal: Negative for myalgias, back pain and joint pain.   Skin: Negative for rash and itching.   Neurological: Negative for dizziness, tingling, tremors, sensory change, focal weakness and headaches.   .    Exam:  /72   Pulse 71   Temp 36.7 °C (98 °F) (Temporal)   Resp 12   Ht 1.702 m (5' 7\")   Wt 59 kg (130 lb)   SpO2 98%   General:  Well nourished, well developed female in NAD  Skin: warm, dry, intact, no evidence of rash or concerning lesions  Head: is grossly normal.  HEENT: eyes clear, conjunctiva normal, PERRLA  Pulmonary: Clear to ausculation. Normal effort. No rales, ronchi, or wheezing.  Cardiovascular: Regular rate and rhythm without murmur. Carotid and radial pulses are intact and equal bilaterally.  Abdomen: soft, non-tender, positive bowel sounds  Musculoskeletal: no clubbing, cyanosis, or edema.  Psych/mental: no depression, anxiety, hallucinations  Neuro: alert, intact, CN 2-12 grossly intact  Pelvic: external genitalia WNL, cervical and bimanual exam normal    Medical decision-making and discussion:    Depo provera given per MA, pt will be notified of results and any further action if needed. Otherwise, she is to follow up annually and as needed        Assessment/Plan:  Leighann was seen today for gynecologic exam and contraception.    Diagnoses and all orders for this visit:    Well woman exam with routine gynecological exam    Encounter for surveillance of injectable contraceptive  -     medroxyPROGESTERone (DEPO-PROVERA) injection 150 mg    Screening for cervical cancer  -  "    Thinprep Pap with HPV; Future    Encounter for gynecological examination  -     Thinprep Pap with HPV; Future    Screening for HPV (human papillomavirus)  -     Thinprep Pap with HPV; Future         Return in about 1 year (around 9/3/2022) for Wellness / Physical Exam.    Please note that this dictation was created using voice recognition software. I have made every reasonable attempt to correct obvious errors, but I expect that there are errors of grammar and possibly content that I did not discover before finalizing the note.

## 2021-09-03 NOTE — ASSESSMENT & PLAN NOTE
Pt here for well woman exam including PAP/HPV. Her last PAP was well over 3 years ago and it was normal. She denies concerns for unusual vaginal discharge, odor, itching, or pain with intercourse. Her menses are absent in the setting of regular depo inhections   She does not have any concerns with her breasts. She is not up to date with her mammogram due to age. She does not do monthly self breast exams. She was encouraged to continue with monthly SBEs, instructions were given during exam.

## 2021-09-03 NOTE — NON-PROVIDER
Leighann Wolf is a 41 y.o. here for a Depo Provera Injection.     Date of last Depo Provera Injection: [facility]   Current date within therapeutic range?: Yes   Urine pregnancy test done (needed if out of date range): no  Date of last office visit:09/09/2021  Date of last pap (if > 21 years old)/ GYN exam: 09/03/2021  Dx: Contraceptive use    Patient tolerated injection and no adverse effects were observed or reported: Yes    # of Administrations remaining in MAR: 0  Next injection due between 11/19 and 12/03.

## 2021-09-07 LAB
FORWARD REASON: SPWHY: NORMAL
FORWARDED TO LAB: SPWHR: NORMAL
SPECIMEN SENT: SPWT1: NORMAL

## 2021-09-15 LAB
CYTOLOGIST CVX/VAG CYTO: NORMAL
CYTOLOGY CVX/VAG DOC CYTO: NORMAL
CYTOLOGY CVX/VAG DOC THIN PREP: NORMAL
DX ICD CODE: NORMAL
HPV I/H RISK 4 DNA CVX QL PROBE+SIG AMP: NEGATIVE
NOTE  190109: NORMAL
OTHER STN SPEC: NORMAL
QC REVIEWED BY  191128: NORMAL
STAT OF ADQ CVX/VAG CYTO-IMP: NORMAL

## 2021-09-24 ENCOUNTER — NON-PROVIDER VISIT (OUTPATIENT)
Dept: MEDICAL GROUP | Facility: PHYSICIAN GROUP | Age: 41
End: 2021-09-24
Payer: COMMERCIAL

## 2021-09-24 DIAGNOSIS — Z23 NEED FOR VACCINATION: ICD-10-CM

## 2021-09-24 PROCEDURE — 90746 HEPB VACCINE 3 DOSE ADULT IM: CPT | Performed by: NURSE PRACTITIONER

## 2021-09-24 PROCEDURE — 90471 IMMUNIZATION ADMIN: CPT | Performed by: NURSE PRACTITIONER

## 2021-09-24 NOTE — PROGRESS NOTES
"Leighann Wolf is a 41 y.o. female here for a non-provider visit for:   HEPATITIS B 3 of 3    Reason for immunization: needed for work/school  Immunization records indicate need for vaccine: Yes, confirmed with [facility] records  Minimum interval has been met for this vaccine: Yes  ABN completed: Not Indicated    VIS Dated  UP TO DATED  was given to patient: Yes  All IAC Questionnaire questions were answered \"No.\"    Patient tolerated injection and no adverse effects were observed or reported: Yes    Pt scheduled for next dose in series: Not Indicated    "

## 2021-12-01 ENCOUNTER — NON-PROVIDER VISIT (OUTPATIENT)
Dept: MEDICAL GROUP | Facility: PHYSICIAN GROUP | Age: 41
End: 2021-12-01
Payer: COMMERCIAL

## 2021-12-01 DIAGNOSIS — Z30.42 ENCOUNTER FOR SURVEILLANCE OF INJECTABLE CONTRACEPTIVE: ICD-10-CM

## 2021-12-01 PROCEDURE — 96372 THER/PROPH/DIAG INJ SC/IM: CPT | Performed by: FAMILY MEDICINE

## 2021-12-01 RX ORDER — MEDROXYPROGESTERONE ACETATE 150 MG/ML
150 INJECTION, SUSPENSION INTRAMUSCULAR ONCE
Status: COMPLETED | OUTPATIENT
Start: 2021-12-01 | End: 2021-12-01

## 2021-12-01 RX ADMIN — MEDROXYPROGESTERONE ACETATE 150 MG: 150 INJECTION, SUSPENSION INTRAMUSCULAR at 16:39

## 2021-12-02 NOTE — NON-PROVIDER
Leighann Wolf is a 41 y.o. here for a Depo Provera Injection.     Date of last Depo Provera Injection: 9/3/21  Current date within therapeutic range?: Yes   Urine pregnancy test done (needed if out of date range): n/a  Date of last office visit:9/3/21  Date of last pap (if > 21 years old)/ GYN exam: 9/7/21  Dx: Contraceptive use    Patient tolerated injection and no adverse effects were observed or reported: Yes    # of Administrations remaining in MAR:   Next injection due between 2/16/22 and 3/2/22.

## 2022-02-25 ENCOUNTER — NON-PROVIDER VISIT (OUTPATIENT)
Dept: MEDICAL GROUP | Facility: PHYSICIAN GROUP | Age: 42
End: 2022-02-25
Payer: COMMERCIAL

## 2022-02-25 RX ORDER — MEDROXYPROGESTERONE ACETATE 150 MG/ML
150 INJECTION, SUSPENSION INTRAMUSCULAR ONCE
OUTPATIENT
Start: 2022-02-25 | End: 2022-02-25

## 2022-02-25 NOTE — NON-PROVIDER
"Leighann Wolf is a 41 y.o. here for a Depo Provera Injection.     Date of last Depo Provera Injection: 12/1/21  Current date within therapeutic range?: Yes   Urine pregnancy test done (needed if out of date range): not performed  Date of last office visit:9/3/21  Date of last pap (if > 21 years old)/ GYN exam:   Dx: Contraceptive use    Patient tolerated injection and no adverse effects were observed or reported: Yes    # of Administrations remaining in MAR: 0  Next injection due between 5/13/22 and 5/27/22    NDC: 5356-6961-90  LOT: [id number]  EXP: 9/2023    RIGHT GLUT    Leighann Wolf is a 41 y.o. female here for a non-provider visit for:   HEPATITIS B 3 of 3    Reason for immunization: continue or complete series started at the office  Immunization records indicate need for vaccine: Yes, confirmed with Epic  Minimum interval has been met for this vaccine: Yes  ABN completed: Not Indicated    VIS Dated  10/15/21 was given to patient: No  All IAC Questionnaire questions were answered \"No.\"    Patient tolerated injection and no adverse effects were observed or reported: Yes    Pt scheduled for next dose in series: Not Indicated    NDC: 8339-2296-63  LOT: [id number]  EXP:4/28/23    LEFT ARM      "

## 2022-05-18 ENCOUNTER — NON-PROVIDER VISIT (OUTPATIENT)
Dept: MEDICAL GROUP | Facility: PHYSICIAN GROUP | Age: 42
End: 2022-05-18
Payer: COMMERCIAL

## 2022-05-18 RX ORDER — MEDROXYPROGESTERONE ACETATE 150 MG/ML
150 INJECTION, SUSPENSION INTRAMUSCULAR ONCE
OUTPATIENT
Start: 2022-05-18 | End: 2022-05-18

## 2022-05-18 NOTE — PROGRESS NOTES
Leighann Wolf is a 42 y.o. here for a Depo Provera Injection.     Date of last Depo Provera Injection: 2/25/22  Current date within therapeutic range?: Yes   Urine pregnancy test done (needed if out of date range): not performed  Date of last office visit:9/3/21  Date of last pap (if > 21 years old)/ GYN exam: 9/7/21  Dx: Contraceptive use    Patient tolerated injection and no adverse effects were observed or reported: Yes    # of Administrations remaining in MAR: 0  Next injection due between 8/3/22 and 8/17/22.

## 2022-08-03 ENCOUNTER — NON-PROVIDER VISIT (OUTPATIENT)
Dept: MEDICAL GROUP | Facility: PHYSICIAN GROUP | Age: 42
End: 2022-08-03
Payer: COMMERCIAL

## 2022-08-03 PROCEDURE — 96372 THER/PROPH/DIAG INJ SC/IM: CPT | Performed by: FAMILY MEDICINE

## 2022-08-03 RX ORDER — MEDROXYPROGESTERONE ACETATE 150 MG/ML
150 INJECTION, SUSPENSION INTRAMUSCULAR ONCE
Status: COMPLETED | OUTPATIENT
Start: 2022-08-03 | End: 2022-08-03

## 2022-08-03 RX ADMIN — MEDROXYPROGESTERONE ACETATE 150 MG: 150 INJECTION, SUSPENSION INTRAMUSCULAR at 17:28

## 2022-08-03 ASSESSMENT — PATIENT HEALTH QUESTIONNAIRE - PHQ9: CLINICAL INTERPRETATION OF PHQ2 SCORE: 0

## 2022-08-04 NOTE — PROGRESS NOTES
Leighann Wolf is a 42 y.o. here for a Depo Provera Injection.     Date of last Depo Provera Injection: 5/18/2022  Current date within therapeutic range?: Yes   Urine pregnancy test done (needed if out of date range): not performed  Date of last office visit:9/3/2021  Date of last pap (if > 21 years old)/ GYN exam: 9/7/2021  Dx: Contraceptive use    Patient tolerated injection and no adverse effects were observed or reported: Yes    # of Administrations remaining in MAR: 0  Next injection due between 10/19/2022 and 11/2/2022.            NDC 7511-0900-25  Lot: [id number]  Exp: 4/30/2026    Left Side

## 2022-08-17 DIAGNOSIS — Z01.84 IMMUNITY STATUS TESTING: ICD-10-CM

## 2022-10-19 ENCOUNTER — NON-PROVIDER VISIT (OUTPATIENT)
Dept: MEDICAL GROUP | Facility: PHYSICIAN GROUP | Age: 42
End: 2022-10-19
Payer: COMMERCIAL

## 2022-10-19 RX ORDER — MEDROXYPROGESTERONE ACETATE 150 MG/ML
150 INJECTION, SUSPENSION INTRAMUSCULAR ONCE
Status: DISCONTINUED | OUTPATIENT
Start: 2022-10-19 | End: 2022-10-19

## 2022-10-19 NOTE — PROGRESS NOTES
Leighann Wolf is a 42 y.o. here for a Depo Provera Injection.     Date of last Depo Provera Injection: 08/03/2022  Current date within therapeutic range?: Yes   Urine pregnancy test done (needed if out of date range): no  Date of last office visit:07/07/2021  Date of last pap (if > 21 years old)/ GYN exam: n/a  Dx: Contraceptive use    Patient tolerated injection and no adverse effects were observed or reported: Yes    # of Administrations remaining in MAR: 0  Next injection due between 01/04/2023 and 01/18/2023.      Let pt know that she need a appt. If no appt is schu. She wont be able to depo

## 2022-10-21 ENCOUNTER — OFFICE VISIT (OUTPATIENT)
Dept: MEDICAL GROUP | Facility: PHYSICIAN GROUP | Age: 42
End: 2022-10-21
Payer: COMMERCIAL

## 2022-10-21 VITALS
TEMPERATURE: 98.3 F | WEIGHT: 136.2 LBS | RESPIRATION RATE: 14 BRPM | HEIGHT: 67 IN | BODY MASS INDEX: 21.38 KG/M2 | SYSTOLIC BLOOD PRESSURE: 118 MMHG | OXYGEN SATURATION: 97 % | DIASTOLIC BLOOD PRESSURE: 72 MMHG | HEART RATE: 74 BPM

## 2022-10-21 DIAGNOSIS — Z13.220 SCREENING, LIPID: ICD-10-CM

## 2022-10-21 DIAGNOSIS — Z12.31 ENCOUNTER FOR SCREENING MAMMOGRAM FOR MALIGNANT NEOPLASM OF BREAST: ICD-10-CM

## 2022-10-21 DIAGNOSIS — K59.09 CHRONIC CONSTIPATION: ICD-10-CM

## 2022-10-21 DIAGNOSIS — K64.8 OTHER HEMORRHOIDS: ICD-10-CM

## 2022-10-21 PROCEDURE — 99213 OFFICE O/P EST LOW 20 MIN: CPT | Performed by: FAMILY MEDICINE

## 2022-10-21 NOTE — PROGRESS NOTES
"Subjective:   Leighann Wolf is a 42 y.o. female here today for evaluation and management of:     Other hemorrhoids  Constipation chronic, chronic hemorrhoids, occ blood in stool.   No family history of colon cancer.   Stays well hydrated.                Current medicines (including changes today)  Current Outpatient Medications   Medication Sig Dispense Refill    acetaminophen (TYLENOL) 500 MG Tab Take 500-1,000 mg by mouth every 6 hours as needed.      ibuprofen (MOTRIN) 200 MG Tab Take 200 mg by mouth every 6 hours as needed.       No current facility-administered medications for this visit.     She  has a past medical history of Immunizations incomplete (10/8/2014).    ROS  No chest pain, no shortness of breath, no abdominal pain       Objective:     /72 (BP Location: Right arm, Patient Position: Sitting, BP Cuff Size: Adult)   Pulse 74   Temp 36.8 °C (98.3 °F) (Temporal)   Resp 14   Ht 1.702 m (5' 7\")   Wt 61.8 kg (136 lb 3.2 oz)   SpO2 97%  Body mass index is 21.33 kg/m².   Physical Exam:  Constitutional: Alert, no distress.  Skin: Warm, dry, good turgor, no rashes in visible areas.  Eye: Equal, round and reactive, conjunctiva clear, lids normal.  ENMT: Lips without lesions, good dentition, oropharynx clear.  Neck: Trachea midline, no masses, no thyromegaly. No cervical or supraclavicular lymphadenopathy  Respiratory: Unlabored respiratory effort, lungs clear to auscultation, no wheezes, no ronchi.  Cardiovascular: Normal S1, S2, no murmur, no edema.  Abdomen: Soft, non-tender, no masses, no hepatosplenomegaly.  Psych: Alert and oriented x3, normal affect and mood.        Assessment and Plan:   The following treatment plan was discussed    1. Other hemorrhoids    2. Encounter for screening mammogram for malignant neoplasm of breast  - MA-SCREENING MAMMO BILAT W/CAD; Future    3. Chronic constipation  - Comp Metabolic Panel; Future  - TSH WITH REFLEX TO FT4; Future    4. Screening, lipid  - Lipid " Profile; Future      Followup: Return in about 6 months (around 4/21/2023) for Lab Review.

## 2022-10-21 NOTE — PATIENT INSTRUCTIONS
Please get fasting labs, fasting for 8-10 hours before next visit. You can make an appointment for the lab or walk in.   Lab hours [facility] location: Monday to Friday 6 am - 4 pm, Saturday 7 am -noon  Even if you lose your lab paperwork, you can still come in to get your lab done.     Please call  to schedule your mammogram

## 2022-10-21 NOTE — ASSESSMENT & PLAN NOTE
Constipation chronic, chronic hemorrhoids, occ blood in stool.   No family history of colon cancer.   Stays well hydrated.

## 2022-10-26 ENCOUNTER — NON-PROVIDER VISIT (OUTPATIENT)
Dept: MEDICAL GROUP | Facility: PHYSICIAN GROUP | Age: 42
End: 2022-10-26
Payer: COMMERCIAL

## 2022-10-26 DIAGNOSIS — Z30.42 ENCOUNTER FOR SURVEILLANCE OF INJECTABLE CONTRACEPTIVE: ICD-10-CM

## 2022-10-26 PROCEDURE — 96372 THER/PROPH/DIAG INJ SC/IM: CPT | Performed by: FAMILY MEDICINE

## 2022-10-26 RX ORDER — MEDROXYPROGESTERONE ACETATE 150 MG/ML
150 INJECTION, SUSPENSION INTRAMUSCULAR ONCE
Status: COMPLETED | OUTPATIENT
Start: 2022-10-26 | End: 2022-10-26

## 2022-10-26 RX ADMIN — MEDROXYPROGESTERONE ACETATE 150 MG: 150 INJECTION, SUSPENSION INTRAMUSCULAR at 15:44

## 2022-10-26 NOTE — PROGRESS NOTES
Leighann Wolf is a 42 y.o. here for a Depo Provera Injection.     Date of last Depo Provera Injection: 08/03/2022  Current date within therapeutic range?: Yes   Urine pregnancy test done (needed if out of date range): no  Date of last office visit:10/21/2022  Date of last pap (if > 21 years old)/ GYN exam: n/a  Dx: Contraceptive use    Patient tolerated injection and no adverse effects were observed or reported: Yes    # of Administrations remaining in MAR: 0  Next injection due between 01/11/2023 and 01/25/2022.

## 2022-11-04 ENCOUNTER — OFFICE VISIT (OUTPATIENT)
Dept: URGENT CARE | Facility: PHYSICIAN GROUP | Age: 42
End: 2022-11-04
Payer: COMMERCIAL

## 2022-11-04 VITALS
BODY MASS INDEX: 21.66 KG/M2 | HEIGHT: 67 IN | SYSTOLIC BLOOD PRESSURE: 100 MMHG | HEART RATE: 88 BPM | OXYGEN SATURATION: 98 % | TEMPERATURE: 98.4 F | WEIGHT: 138 LBS | RESPIRATION RATE: 16 BRPM | DIASTOLIC BLOOD PRESSURE: 78 MMHG

## 2022-11-04 DIAGNOSIS — R30.0 DYSURIA: ICD-10-CM

## 2022-11-04 LAB
APPEARANCE UR: NORMAL
BILIRUB UR STRIP-MCNC: NORMAL MG/DL
COLOR UR AUTO: NORMAL
GLUCOSE UR STRIP.AUTO-MCNC: NORMAL MG/DL
KETONES UR STRIP.AUTO-MCNC: NORMAL MG/DL
LEUKOCYTE ESTERASE UR QL STRIP.AUTO: NORMAL
NITRITE UR QL STRIP.AUTO: NORMAL
PH UR STRIP.AUTO: 5.5 [PH] (ref 5–8)
PROT UR QL STRIP: NORMAL MG/DL
RBC UR QL AUTO: NORMAL
SP GR UR STRIP.AUTO: <=1.005
UROBILINOGEN UR STRIP-MCNC: 0.2 MG/DL

## 2022-11-04 PROCEDURE — 99213 OFFICE O/P EST LOW 20 MIN: CPT | Performed by: FAMILY MEDICINE

## 2022-11-04 PROCEDURE — 81002 URINALYSIS NONAUTO W/O SCOPE: CPT | Performed by: FAMILY MEDICINE

## 2022-11-04 RX ORDER — PHENAZOPYRIDINE HYDROCHLORIDE 200 MG/1
200 TABLET, FILM COATED ORAL 3 TIMES DAILY PRN
Qty: 6 TABLET | Refills: 0 | Status: SHIPPED | OUTPATIENT
Start: 2022-11-04 | End: 2023-03-06

## 2022-11-04 RX ORDER — NITROFURANTOIN 25; 75 MG/1; MG/1
100 CAPSULE ORAL 2 TIMES DAILY
Qty: 10 CAPSULE | Refills: 0 | Status: SHIPPED | OUTPATIENT
Start: 2022-11-04 | End: 2022-11-09

## 2022-11-04 NOTE — PROGRESS NOTES
"Subjective     Leighann Wolf is a 42 y.o. female who presents with Painful Urination (X2days )      - This is a pleasant and nontoxic appearing 42 y.o. female who has come to the walk-in clinic today for:    #1) 2 days urinary freq and pressure. No NVFC      ALLERGIES:  Patient has no known allergies.     PMH:  Past Medical History:   Diagnosis Date    Immunizations incomplete 10/8/2014        PSH:  History reviewed. No pertinent surgical history.    MEDS:    Current Outpatient Medications:     nitrofurantoin (MACROBID) 100 MG Cap, Take 1 Capsule by mouth 2 times a day for 5 days., Disp: 10 Capsule, Rfl: 0    phenazopyridine (PYRIDIUM) 200 MG Tab, Take 1 Tablet by mouth 3 times a day as needed for Mild Pain., Disp: 6 Tablet, Rfl: 0    ** I have documented what I find to be significant in regards to past medical, social, family and surgical history  in my HPI or under PMH/PSH/FH review section, otherwise it is noncontributory **         HPI    Review of Systems   Genitourinary:  Positive for dysuria and frequency.   All other systems reviewed and are negative.           Objective     /78   Pulse 88   Temp 36.9 °C (98.4 °F) (Temporal)   Resp 16   Ht 1.702 m (5' 7\")   Wt 62.6 kg (138 lb)   SpO2 98%   BMI 21.61 kg/m²      Physical Exam  Vitals and nursing note reviewed.   Constitutional:       General: She is not in acute distress.     Appearance: Normal appearance. She is well-developed.   HENT:      Head: Normocephalic.   Cardiovascular:      Heart sounds: Normal heart sounds. No murmur heard.  Pulmonary:      Effort: Pulmonary effort is normal. No respiratory distress.   Abdominal:      Palpations: Abdomen is soft.      Tenderness: There is no abdominal tenderness.   Neurological:      Mental Status: She is alert.      Motor: No abnormal muscle tone.   Psychiatric:         Mood and Affect: Mood normal.         Behavior: Behavior normal.                           Assessment & Plan       1. Dysuria  POCT " Urinalysis    nitrofurantoin (MACROBID) 100 MG Cap    phenazopyridine (PYRIDIUM) 200 MG Tab          - Dx, plan & d/c instructions discussed   - Rest, stay hydrated  - OTC Motrin and/or Tylenol as needed      Follow up with your regular primary care providers office for a recheck on today's visit. ER if not improving in 2-3 days or if feeling/getting worse.     Any realistic side effects of medications that may have been given today reviewed.     Patient left in stable condition     POCT results reviewed/discussed

## 2022-11-21 ENCOUNTER — HOSPITAL ENCOUNTER (OUTPATIENT)
Dept: LAB | Facility: MEDICAL CENTER | Age: 42
End: 2022-11-21
Attending: FAMILY MEDICINE
Payer: COMMERCIAL

## 2022-11-21 DIAGNOSIS — K59.09 CHRONIC CONSTIPATION: ICD-10-CM

## 2022-11-21 DIAGNOSIS — Z13.220 SCREENING, LIPID: ICD-10-CM

## 2022-11-21 DIAGNOSIS — Z01.84 IMMUNITY STATUS TESTING: ICD-10-CM

## 2022-11-21 LAB
ALBUMIN SERPL BCP-MCNC: 4.5 G/DL (ref 3.2–4.9)
ALBUMIN/GLOB SERPL: 2 G/DL
ALP SERPL-CCNC: 67 U/L (ref 30–99)
ALT SERPL-CCNC: 9 U/L (ref 2–50)
ANION GAP SERPL CALC-SCNC: 11 MMOL/L (ref 7–16)
AST SERPL-CCNC: 11 U/L (ref 12–45)
BILIRUB SERPL-MCNC: 1.7 MG/DL (ref 0.1–1.5)
BUN SERPL-MCNC: 10 MG/DL (ref 8–22)
CALCIUM SERPL-MCNC: 8.8 MG/DL (ref 8.5–10.5)
CHLORIDE SERPL-SCNC: 105 MMOL/L (ref 96–112)
CHOLEST SERPL-MCNC: 155 MG/DL (ref 100–199)
CO2 SERPL-SCNC: 23 MMOL/L (ref 20–33)
CREAT SERPL-MCNC: 0.39 MG/DL (ref 0.5–1.4)
FASTING STATUS PATIENT QL REPORTED: NORMAL
GFR SERPLBLD CREATININE-BSD FMLA CKD-EPI: 129 ML/MIN/1.73 M 2
GLOBULIN SER CALC-MCNC: 2.3 G/DL (ref 1.9–3.5)
GLUCOSE SERPL-MCNC: 95 MG/DL (ref 65–99)
HBV SURFACE AB SERPL IA-ACNC: 4341 MIU/ML (ref 0–10)
HDLC SERPL-MCNC: 40 MG/DL
LDLC SERPL CALC-MCNC: 95 MG/DL
POTASSIUM SERPL-SCNC: 4.2 MMOL/L (ref 3.6–5.5)
PROT SERPL-MCNC: 6.8 G/DL (ref 6–8.2)
SODIUM SERPL-SCNC: 139 MMOL/L (ref 135–145)
TRIGL SERPL-MCNC: 100 MG/DL (ref 0–149)
TSH SERPL DL<=0.005 MIU/L-ACNC: 4.62 UIU/ML (ref 0.38–5.33)

## 2022-11-21 PROCEDURE — 86706 HEP B SURFACE ANTIBODY: CPT

## 2022-11-21 PROCEDURE — 84443 ASSAY THYROID STIM HORMONE: CPT

## 2022-11-21 PROCEDURE — 80061 LIPID PANEL: CPT

## 2022-11-21 PROCEDURE — 36415 COLL VENOUS BLD VENIPUNCTURE: CPT

## 2022-11-21 PROCEDURE — 80053 COMPREHEN METABOLIC PANEL: CPT

## 2023-01-18 ENCOUNTER — NON-PROVIDER VISIT (OUTPATIENT)
Dept: MEDICAL GROUP | Facility: PHYSICIAN GROUP | Age: 43
End: 2023-01-18
Payer: COMMERCIAL

## 2023-01-18 PROCEDURE — 96372 THER/PROPH/DIAG INJ SC/IM: CPT | Performed by: NURSE PRACTITIONER

## 2023-01-18 RX ORDER — MEDROXYPROGESTERONE ACETATE 150 MG/ML
150 INJECTION, SUSPENSION INTRAMUSCULAR ONCE
Status: COMPLETED | OUTPATIENT
Start: 2023-01-18 | End: 2023-01-18

## 2023-01-18 RX ADMIN — MEDROXYPROGESTERONE ACETATE 150 MG: 150 INJECTION, SUSPENSION INTRAMUSCULAR at 17:52

## 2023-01-18 NOTE — PROGRESS NOTES
Leighann Wolf is a 42 y.o. here for a Depo Provera Injection.     Date of last Depo Provera Injection: 10/26/2022  Current date within therapeutic range?: Yes   Urine pregnancy test done (needed if out of date range): no  Date of last office visit:10/21/2022  Date of last pap (if > 21 years old)/ GYN exam: n/a  Dx: Contraceptive use    Patient tolerated injection and no adverse effects were observed or reported: Yes    # of Administrations remaining in MAR: 0  Next injection due between 04/05 and 04/19/2023.

## 2023-03-05 ENCOUNTER — OFFICE VISIT (OUTPATIENT)
Dept: URGENT CARE | Facility: PHYSICIAN GROUP | Age: 43
End: 2023-03-05
Payer: COMMERCIAL

## 2023-03-05 VITALS
OXYGEN SATURATION: 97 % | RESPIRATION RATE: 16 BRPM | HEART RATE: 80 BPM | SYSTOLIC BLOOD PRESSURE: 118 MMHG | TEMPERATURE: 98.5 F | DIASTOLIC BLOOD PRESSURE: 80 MMHG | BODY MASS INDEX: 20.72 KG/M2 | HEIGHT: 67 IN | WEIGHT: 132 LBS

## 2023-03-05 DIAGNOSIS — H57.89 IRRITATION OF EYE: ICD-10-CM

## 2023-03-05 DIAGNOSIS — H53.10 EYE STRAIN, LEFT: ICD-10-CM

## 2023-03-05 DIAGNOSIS — J01.00 ACUTE NON-RECURRENT MAXILLARY SINUSITIS: ICD-10-CM

## 2023-03-05 PROCEDURE — 99214 OFFICE O/P EST MOD 30 MIN: CPT | Performed by: PHYSICIAN ASSISTANT

## 2023-03-05 RX ORDER — AZITHROMYCIN 250 MG/1
TABLET, FILM COATED ORAL
Qty: 6 TABLET | Refills: 0 | Status: SHIPPED | OUTPATIENT
Start: 2023-03-05 | End: 2023-07-21

## 2023-03-05 ASSESSMENT — ENCOUNTER SYMPTOMS
EYE DISCHARGE: 0
EYE PAIN: 1
NAUSEA: 0
VOMITING: 0
ABDOMINAL PAIN: 0
PHOTOPHOBIA: 0
BLURRED VISION: 0
DIARRHEA: 0
CARDIOVASCULAR NEGATIVE: 1
CHILLS: 0
EYE ITCHING: 0
FOREIGN BODY SENSATION: 0
FEVER: 0
RESPIRATORY NEGATIVE: 1
DOUBLE VISION: 0

## 2023-03-05 NOTE — PROGRESS NOTES
Subjective     Leighann Wolf is a very pleasant 42 y.o. female who presents with Eye Strain ((L) side, irritation and burning, x3 weeks )            Patient presenting with left eye irritation and watering.  She has left facial pain with nasal congestion.  There is no injury or precipitating event.  She has no foreign body sensation.  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    Eye Problem   The left eye is affected. This is a new problem. The current episode started 1 to 4 weeks ago. The problem occurs constantly. The problem has been unchanged. There was no injury mechanism. There is No known exposure to pink eye. She Does not wear contacts. Associated symptoms include eye redness and a recent URI. Pertinent negatives include no blurred vision, eye discharge, double vision, fever, foreign body sensation, itching, nausea, photophobia or vomiting. She has tried eye drops for the symptoms. The treatment provided mild relief.       PMH:  has a past medical history of Immunizations incomplete (10/8/2014).  MEDS:   Current Outpatient Medications:     azithromycin (ZITHROMAX) 250 MG Tab, Z-milton, Use as directed., Disp: 6 Tablet, Rfl: 0    phenazopyridine (PYRIDIUM) 200 MG Tab, Take 1 Tablet by mouth 3 times a day as needed for Mild Pain. (Patient not taking: Reported on 3/5/2023), Disp: 6 Tablet, Rfl: 0  ALLERGIES: No Known Allergies  SURGHX: No past surgical history on file.  SOCHX:  reports that she has never smoked. She has never used smokeless tobacco. She reports that she does not currently use alcohol. She reports that she does not currently use drugs.  FH: family history includes Heart Disease in her mother.    Review of Systems   Constitutional:  Negative for chills and fever.   Eyes:  Positive for pain and redness. Negative for blurred vision, double vision, photophobia, discharge and itching.   Respiratory: Negative.     Cardiovascular: Negative.   "  Gastrointestinal:  Negative for abdominal pain, diarrhea, nausea and vomiting.       Medications, Allergies, and current problem list reviewed today in Epic         Objective     /80   Pulse 80   Temp 36.9 °C (98.5 °F) (Temporal)   Resp 16   Ht 1.702 m (5' 7\")   Wt 59.9 kg (132 lb)   SpO2 97%   BMI 20.67 kg/m²      Physical Exam  Vitals and nursing note reviewed.   Constitutional:       General: She is not in acute distress.     Appearance: She is well-developed. She is not ill-appearing, toxic-appearing or diaphoretic.   HENT:      Head: Normocephalic and atraumatic.      Right Ear: Tympanic membrane, ear canal and external ear normal.      Left Ear: Tympanic membrane, ear canal and external ear normal.      Nose: Congestion and rhinorrhea present.      Left Sinus: Maxillary sinus tenderness present.      Mouth/Throat:      Mouth: Mucous membranes are moist.      Pharynx: No oropharyngeal exudate or posterior oropharyngeal erythema.   Eyes:      General: Lids are normal. Lids are everted, no foreign bodies appreciated. Vision grossly intact. Gaze aligned appropriately. No allergic shiner or visual field deficit.        Right eye: No foreign body, discharge or hordeolum.         Left eye: No foreign body, discharge or hordeolum.      Extraocular Movements: Extraocular movements intact.      Conjunctiva/sclera:      Left eye: Left conjunctiva is injected. Chemosis present. No hemorrhage.     Pupils: Pupils are equal, round, and reactive to light.      Left eye: No corneal abrasion or fluorescein uptake.      Slit lamp exam:     Left eye: No corneal ulcer, foreign body, hyphema or hypopyon.   Cardiovascular:      Rate and Rhythm: Normal rate and regular rhythm.      Pulses: Normal pulses.      Heart sounds: Normal heart sounds.   Pulmonary:      Effort: Pulmonary effort is normal. No respiratory distress.      Breath sounds: Normal breath sounds. No wheezing, rhonchi or rales.   Musculoskeletal:         " General: No swelling or tenderness. Normal range of motion.      Cervical back: Normal range of motion and neck supple.      Right lower leg: No edema.      Left lower leg: No edema.   Lymphadenopathy:      Cervical: No cervical adenopathy.   Skin:     General: Skin is warm and dry.   Neurological:      Mental Status: She is alert and oriented to person, place, and time.   Psychiatric:         Mood and Affect: Mood normal.         Behavior: Behavior normal.         Thought Content: Thought content normal.         Judgment: Judgment normal.                           Assessment & Plan     This is a very pleasant 42-year-old female presenting with left eye irritation, increased watering, left infraorbital/maxillary tenderness and congestion for the past 3-4 weeks.  She denies eye discharge, fever, blurry vision, pain, foreign body sensation.  She denies any injury or precipitating event and does not wear contact lenses.  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  She has had slight nasal congestion and rhinorrhea and may be concerned about a sinus infection.  Her vital signs are normal.  Her eye exam shows left conjunctival injection with chemosis.  There is no foreign body or exudate.  Vision grossly intact.  PERRLA, EOMs intact.  Fluorescein exam normal showing no abrasion, foreign body or ulcer.  She does have nasal congestion with left-sided maxillary TTP.  She will follow-up with eye specialty for more in-depth evaluation.  I will treat her for a left-sided bacterial sinus infection based on duration.  Continue OTC eyedrops.  ER and return to clinic precautions discussed    1. Acute non-recurrent maxillary sinusitis  azithromycin (ZITHROMAX) 250 MG Tab      2. Irritation of eye        3. Eye strain, left            Return to clinic or go to ED if symptoms worsen or persist. Red flag symptoms discussed and indications for  ED discussed at length. Patient/Parent/Guardian voices understanding. Follow-up with your primary care provider in 3-5 days. All side effects of medication discussed including allergic response, GI upset, tendon injury, rash, sedation etc.    Please note that this dictation was created using voice recognition software. I have made every reasonable attempt to correct obvious errors, but I expect that there are errors of grammar and possibly content that I did not discover before finalizing the note.

## 2023-03-06 ASSESSMENT — VISUAL ACUITY: OU: 1

## 2023-03-06 ASSESSMENT — ENCOUNTER SYMPTOMS: EYE REDNESS: 1

## 2023-04-13 ENCOUNTER — NON-PROVIDER VISIT (OUTPATIENT)
Dept: MEDICAL GROUP | Facility: PHYSICIAN GROUP | Age: 43
End: 2023-04-13
Payer: COMMERCIAL

## 2023-04-13 PROCEDURE — 96372 THER/PROPH/DIAG INJ SC/IM: CPT | Performed by: FAMILY MEDICINE

## 2023-04-13 RX ORDER — MEDROXYPROGESTERONE ACETATE 150 MG/ML
150 INJECTION, SUSPENSION INTRAMUSCULAR ONCE
Status: COMPLETED | OUTPATIENT
Start: 2023-04-13 | End: 2023-04-13

## 2023-04-13 RX ADMIN — MEDROXYPROGESTERONE ACETATE 150 MG: 150 INJECTION, SUSPENSION INTRAMUSCULAR at 16:47

## 2023-04-13 NOTE — PROGRESS NOTES
Leighann Wolf is a 42 y.o. here for a Depo Provera Injection.     Date of last Depo Provera Injection: 01/08/2023  Current date within therapeutic range?: Yes   Urine pregnancy test done (needed if out of date range): no  Date of last office visit:10/21/2022  Date of last pap (if > 21 years old)/ GYN exam: n/a    Dx: Contraceptive use    Patient tolerated injection and no adverse effects were observed or reported: Yes    # of Administrations remaining in MAR: 0    Next injection due between jun 13, 2023  and July 13, 2023      NDC 0009 0746 30  Lot [id number]  Exp  04/2025    .

## 2023-07-05 ENCOUNTER — NON-PROVIDER VISIT (OUTPATIENT)
Dept: MEDICAL GROUP | Facility: PHYSICIAN GROUP | Age: 43
End: 2023-07-05
Payer: COMMERCIAL

## 2023-07-05 PROCEDURE — 96372 THER/PROPH/DIAG INJ SC/IM: CPT | Performed by: FAMILY MEDICINE

## 2023-07-05 RX ORDER — MEDROXYPROGESTERONE ACETATE 150 MG/ML
150 INJECTION, SUSPENSION INTRAMUSCULAR ONCE
Status: COMPLETED | OUTPATIENT
Start: 2023-07-05 | End: 2023-07-05

## 2023-07-05 RX ADMIN — MEDROXYPROGESTERONE ACETATE 150 MG: 150 INJECTION, SUSPENSION INTRAMUSCULAR at 17:34

## 2023-07-05 NOTE — PROGRESS NOTES
Leighann Wolf is a 43 y.o. here for a Depo Provera Injection.     Date of last Depo Provera Injection: 04/13/2023  Current date within therapeutic range?: Yes   Urine pregnancy test done (needed if out of date range): no  Date of last office visit:10/21/2022  Date of last pap (if > 21 years old)/ GYN exam: n/a  Dx: Contraceptive use    Patient tolerated injection and no adverse effects were observed or reported: Yes    # of Administrations remaining in MAR: 0  Next injection due between 09/20/2023 and 10/04/2023.

## 2023-07-21 ENCOUNTER — APPOINTMENT (OUTPATIENT)
Dept: RADIOLOGY | Facility: IMAGING CENTER | Age: 43
End: 2023-07-21
Attending: FAMILY MEDICINE
Payer: COMMERCIAL

## 2023-07-21 ENCOUNTER — OFFICE VISIT (OUTPATIENT)
Dept: URGENT CARE | Facility: PHYSICIAN GROUP | Age: 43
End: 2023-07-21
Payer: COMMERCIAL

## 2023-07-21 ENCOUNTER — HOSPITAL ENCOUNTER (OUTPATIENT)
Facility: MEDICAL CENTER | Age: 43
End: 2023-07-21
Attending: FAMILY MEDICINE
Payer: COMMERCIAL

## 2023-07-21 VITALS
BODY MASS INDEX: 20.4 KG/M2 | WEIGHT: 130 LBS | DIASTOLIC BLOOD PRESSURE: 74 MMHG | OXYGEN SATURATION: 100 % | SYSTOLIC BLOOD PRESSURE: 102 MMHG | HEART RATE: 74 BPM | RESPIRATION RATE: 18 BRPM | HEIGHT: 67 IN | TEMPERATURE: 98.7 F

## 2023-07-21 DIAGNOSIS — S16.1XXA STRAIN OF NECK MUSCLE, INITIAL ENCOUNTER: ICD-10-CM

## 2023-07-21 DIAGNOSIS — R51.9 NONINTRACTABLE HEADACHE, UNSPECIFIED CHRONICITY PATTERN, UNSPECIFIED HEADACHE TYPE: ICD-10-CM

## 2023-07-21 DIAGNOSIS — S46.912A STRAIN OF LEFT SHOULDER, INITIAL ENCOUNTER: ICD-10-CM

## 2023-07-21 DIAGNOSIS — R53.83 MALAISE AND FATIGUE: ICD-10-CM

## 2023-07-21 DIAGNOSIS — R53.81 MALAISE AND FATIGUE: ICD-10-CM

## 2023-07-21 LAB
APPEARANCE UR: NORMAL
BILIRUB UR STRIP-MCNC: NORMAL MG/DL
COLOR UR AUTO: YELLOW
GLUCOSE UR STRIP.AUTO-MCNC: NORMAL MG/DL
KETONES UR STRIP.AUTO-MCNC: NORMAL MG/DL
LEUKOCYTE ESTERASE UR QL STRIP.AUTO: NORMAL
NITRITE UR QL STRIP.AUTO: NORMAL
PH UR STRIP.AUTO: 5.5 [PH] (ref 5–8)
PROT UR QL STRIP: NORMAL MG/DL
RBC UR QL AUTO: NORMAL
SP GR UR STRIP.AUTO: 1.02
UROBILINOGEN UR STRIP-MCNC: 0.2 MG/DL

## 2023-07-21 PROCEDURE — 1125F AMNT PAIN NOTED PAIN PRSNT: CPT | Performed by: FAMILY MEDICINE

## 2023-07-21 PROCEDURE — 87077 CULTURE AEROBIC IDENTIFY: CPT

## 2023-07-21 PROCEDURE — 87086 URINE CULTURE/COLONY COUNT: CPT

## 2023-07-21 PROCEDURE — 3078F DIAST BP <80 MM HG: CPT | Performed by: FAMILY MEDICINE

## 2023-07-21 PROCEDURE — 81002 URINALYSIS NONAUTO W/O SCOPE: CPT | Performed by: FAMILY MEDICINE

## 2023-07-21 PROCEDURE — 99214 OFFICE O/P EST MOD 30 MIN: CPT | Performed by: FAMILY MEDICINE

## 2023-07-21 PROCEDURE — 72040 X-RAY EXAM NECK SPINE 2-3 VW: CPT | Mod: TC,FY | Performed by: RADIOLOGY

## 2023-07-21 PROCEDURE — 73030 X-RAY EXAM OF SHOULDER: CPT | Mod: TC,FY,LT | Performed by: RADIOLOGY

## 2023-07-21 PROCEDURE — 3074F SYST BP LT 130 MM HG: CPT | Performed by: FAMILY MEDICINE

## 2023-07-21 RX ORDER — MELOXICAM 7.5 MG/1
7.5 TABLET ORAL DAILY
Qty: 7 TABLET | Refills: 0 | Status: SHIPPED | OUTPATIENT
Start: 2023-07-21

## 2023-07-21 RX ORDER — CYCLOBENZAPRINE HCL 5 MG
5-10 TABLET ORAL 3 TIMES DAILY PRN
Qty: 30 TABLET | Refills: 0 | Status: SHIPPED | OUTPATIENT
Start: 2023-07-21

## 2023-07-21 RX ORDER — NITROFURANTOIN 25; 75 MG/1; MG/1
100 CAPSULE ORAL 2 TIMES DAILY
Qty: 10 CAPSULE | Refills: 0 | Status: SHIPPED | OUTPATIENT
Start: 2023-07-21 | End: 2023-07-26

## 2023-07-21 ASSESSMENT — ENCOUNTER SYMPTOMS: MYALGIAS: 1

## 2023-07-21 ASSESSMENT — PAIN SCALES - GENERAL: PAINLEVEL: 10=SEVERE PAIN

## 2023-07-21 NOTE — PROGRESS NOTES
"Subjective     Leighann Wolf is a 43 y.o. female who presents with UTI (Weak and no energy. Past HX of UTI. ) and Shoulder Pain (LT scapula pain . X months. Giving headaches )      - This is a pleasant and nontoxic appearing 43 y.o. person who has come to the walk-in clinic today for:    #1) ~1 day w/ malaise and urinary freq. Gets this when has a UTI and feels same. No nvfc.     Ongoing Lt shoulder pain for a year or more, now affecting Lt side of neck and causing headache on Lt side head for past week. No recent trauma or limb weakness/numbness or nvfc.       ALLERGIES:  Patient has no known allergies.     PMH:  Past Medical History:   Diagnosis Date    Immunizations incomplete 10/8/2014        PSH:  History reviewed. No pertinent surgical history.    MEDS:    Current Outpatient Medications:     nitrofurantoin (MACROBID) 100 MG Cap, Take 1 Capsule by mouth 2 times a day for 5 days., Disp: 10 Capsule, Rfl: 0    meloxicam (MOBIC) 7.5 MG Tab, Take 1 Tablet by mouth every day., Disp: 7 Tablet, Rfl: 0    cyclobenzaprine (FLEXERIL) 5 mg tablet, Take 1-2 Tablets by mouth 3 times a day as needed for Muscle Spasms or Mild Pain., Disp: 30 Tablet, Rfl: 0    ** I have documented what I find to be significant in regards to past medical, social, family and surgical history  in my HPI or under PMH/PSH/FH review section, otherwise it is noncontributory **         HPI    Review of Systems   Constitutional:  Positive for malaise/fatigue.   Musculoskeletal:  Positive for myalgias.   All other systems reviewed and are negative.             Objective     /74   Pulse 74   Temp 37.1 °C (98.7 °F) (Temporal)   Resp 18   Ht 1.702 m (5' 7\")   Wt 59 kg (130 lb)   SpO2 100%   BMI 20.36 kg/m²      Physical Exam  Vitals and nursing note reviewed.   Constitutional:       General: She is not in acute distress.     Appearance: Normal appearance. She is well-developed.   HENT:      Head: Normocephalic.        Comments: No wounds, " discoloration, deformity, edema. Good SROM. Some TTP  Cardiovascular:      Heart sounds: Normal heart sounds. No murmur heard.  Pulmonary:      Effort: Pulmonary effort is normal. No respiratory distress.   Abdominal:      Palpations: Abdomen is soft.      Tenderness: There is no abdominal tenderness.   Musculoskeletal:        Arms:       Comments: No wounds, discoloration, deformity, edema. Good SROM. Some TTP. NVI    Neurological:      Mental Status: She is alert.      Motor: No abnormal muscle tone.   Psychiatric:         Mood and Affect: Mood normal.         Behavior: Behavior normal.         Assessment & Plan     1. Malaise and fatigue  POCT Urinalysis    URINE CULTURE(NEW)    nitrofurantoin (MACROBID) 100 MG Cap      2. Strain of left shoulder, initial encounter  DX-SHOULDER 2+ LEFT    Referral to Sports Medicine    meloxicam (MOBIC) 7.5 MG Tab      3. Strain of neck muscle, initial encounter  DX-CERVICAL SPINE-2 OR 3 VIEWS    Referral to Sports Medicine    meloxicam (MOBIC) 7.5 MG Tab    cyclobenzaprine (FLEXERIL) 5 mg tablet      4. Nonintractable headache, unspecified chronicity pattern, unspecified headache type  Referral to Sports Medicine    meloxicam (MOBIC) 7.5 MG Tab    cyclobenzaprine (FLEXERIL) 5 mg tablet          - Dx, plan & d/c instructions discussed   - Rest, stay hydrated  - OTC Tylenol as needed      Follow up with your regular primary care providers office for a recheck on today's visit. ER if not improving in 2-3 days or if feeling/getting worse.     Any realistic side effects of medications that may have been given today reviewed.     Patient left in stable condition     POCT results reviewed/discussed    Today's radiology imaging personally reviewed by me today on day of visit and Radiology readings reviewed and discussed w/ patient today.       Pertinent prior lab work and/or imaging studies in Epic have been reviewed by me today on day of this visit.      Pertinent prior office visit  notes in Epic have been reviewed by me today on day of this visit.

## 2023-09-11 ENCOUNTER — TELEPHONE (OUTPATIENT)
Dept: HEALTH INFORMATION MANAGEMENT | Facility: OTHER | Age: 43
End: 2023-09-11

## 2023-10-04 ENCOUNTER — NON-PROVIDER VISIT (OUTPATIENT)
Dept: MEDICAL GROUP | Facility: PHYSICIAN GROUP | Age: 43
End: 2023-10-04
Payer: COMMERCIAL

## 2023-10-04 PROCEDURE — 96372 THER/PROPH/DIAG INJ SC/IM: CPT | Performed by: NURSE PRACTITIONER

## 2023-10-04 RX ORDER — MEDROXYPROGESTERONE ACETATE 150 MG/ML
150 INJECTION, SUSPENSION INTRAMUSCULAR ONCE
Status: COMPLETED | OUTPATIENT
Start: 2023-10-04 | End: 2023-10-04

## 2023-10-04 RX ADMIN — MEDROXYPROGESTERONE ACETATE 150 MG: 150 INJECTION, SUSPENSION INTRAMUSCULAR at 18:45

## 2023-10-04 NOTE — PROGRESS NOTES
Leighann Wolf is a 43 y.o. here for a Depo Provera Injection.     Date of last Depo Provera Injection: 07/02/2023  Current date within therapeutic range?: Yes   Urine pregnancy test done (needed if out of date range): no  Date of last office visit:10/21/202  Date of last pap (if > 21 years old)/ GYN exam: n/a  Dx: Contraceptive use    Patient tolerated injection and no adverse effects were observed or reported: Yes    # of Administrations remaining in MAR: 0  Next injection due between 12/20/2023 and 01/03/2023.

## 2023-12-27 ENCOUNTER — NON-PROVIDER VISIT (OUTPATIENT)
Dept: MEDICAL GROUP | Facility: PHYSICIAN GROUP | Age: 43
End: 2023-12-27
Payer: COMMERCIAL

## 2023-12-27 PROCEDURE — 96372 THER/PROPH/DIAG INJ SC/IM: CPT | Performed by: FAMILY MEDICINE

## 2023-12-27 RX ADMIN — MEDROXYPROGESTERONE ACETATE 150 MG: 150 INJECTION, SUSPENSION INTRAMUSCULAR at 17:08

## 2023-12-27 NOTE — PROGRESS NOTES
Leighann Wolf is a 43 y.o. here for a Depo Provera Injection.     Date of last Depo Provera Injection: 10/04/2023  Current date within therapeutic range?: Yes   Urine pregnancy test done (needed if out of date range): no  Date of last office visit:10/21/2022  Date of last pap (if > 21 years old)/ GYN exam: 09/07/2021/  Dx: Contraceptive use    Patient tolerated injection and no adverse effects were observed or reported: Yes    # of Administrations remaining in MAR: 0  Next injection due between March 14, 2024 and March 28 2024      NDC 2309-2655-30  Exp 04/28/2025  Lot [id number]    .

## 2023-12-28 RX ORDER — MEDROXYPROGESTERONE ACETATE 150 MG/ML
150 INJECTION, SUSPENSION INTRAMUSCULAR ONCE
Status: COMPLETED | OUTPATIENT
Start: 2023-12-28 | End: 2023-12-27

## 2024-01-14 ENCOUNTER — OFFICE VISIT (OUTPATIENT)
Dept: URGENT CARE | Facility: PHYSICIAN GROUP | Age: 44
End: 2024-01-14
Payer: COMMERCIAL

## 2024-01-14 VITALS
BODY MASS INDEX: 19.78 KG/M2 | HEIGHT: 67 IN | HEART RATE: 71 BPM | OXYGEN SATURATION: 98 % | WEIGHT: 126 LBS | DIASTOLIC BLOOD PRESSURE: 68 MMHG | TEMPERATURE: 98.7 F | RESPIRATION RATE: 16 BRPM | SYSTOLIC BLOOD PRESSURE: 118 MMHG

## 2024-01-14 DIAGNOSIS — J10.1 INFLUENZA B: ICD-10-CM

## 2024-01-14 LAB
FLUAV RNA SPEC QL NAA+PROBE: NEGATIVE
FLUBV RNA SPEC QL NAA+PROBE: POSITIVE
RSV RNA SPEC QL NAA+PROBE: NEGATIVE
SARS-COV-2 RNA RESP QL NAA+PROBE: NEGATIVE

## 2024-01-14 PROCEDURE — 3078F DIAST BP <80 MM HG: CPT | Performed by: FAMILY MEDICINE

## 2024-01-14 PROCEDURE — 3074F SYST BP LT 130 MM HG: CPT | Performed by: FAMILY MEDICINE

## 2024-01-14 PROCEDURE — 0241U POCT CEPHEID COV-2, FLU A/B, RSV - PCR: CPT | Performed by: FAMILY MEDICINE

## 2024-01-14 PROCEDURE — 99213 OFFICE O/P EST LOW 20 MIN: CPT | Performed by: FAMILY MEDICINE

## 2024-01-14 RX ORDER — BENZONATATE 200 MG/1
200 CAPSULE ORAL 3 TIMES DAILY PRN
Qty: 45 CAPSULE | Refills: 0 | Status: SHIPPED | OUTPATIENT
Start: 2024-01-14 | End: 2024-03-28

## 2024-01-14 RX ORDER — OSELTAMIVIR PHOSPHATE 75 MG/1
75 CAPSULE ORAL 2 TIMES DAILY
Qty: 10 CAPSULE | Refills: 0 | Status: SHIPPED
Start: 2024-01-14 | End: 2024-01-15 | Stop reason: SDUPTHER

## 2024-01-14 NOTE — PROGRESS NOTES
"CC:  cough        Cough  This is a new problem. The current episode started 2 days ago. The problem has been unchanged. The problem occurs constantly. The cough is dry. Associated symptoms include : fatigue, muscle aches, fever. Pertinent negatives include no   headaches, nausea, vomiting, diarrhea, sweats, weight loss or wheezing. Nothing aggravates the symptoms.  Patient has tried nothing for the symptoms. There is no history of asthma.        Past Medical History:   Diagnosis Date    Immunizations incomplete 10/8/2014         Social History     Tobacco Use    Smoking status: Never    Smokeless tobacco: Never   Vaping Use    Vaping Use: Never used   Substance Use Topics    Alcohol use: Not Currently     Comment: quit back in 2015    Drug use: Not Currently         Current Outpatient Medications on File Prior to Visit   Medication Sig Dispense Refill    meloxicam (MOBIC) 7.5 MG Tab Take 1 Tablet by mouth every day. (Patient not taking: Reported on 1/14/2024) 7 Tablet 0    cyclobenzaprine (FLEXERIL) 5 mg tablet Take 1-2 Tablets by mouth 3 times a day as needed for Muscle Spasms or Mild Pain. (Patient not taking: Reported on 1/14/2024) 30 Tablet 0     No current facility-administered medications on file prior to visit.                    Review of Systems   Constitutional: Negative for fever and weight loss.   HENT: negative for otalgia  Cardiovascular - denies chest pain or dyspnea  Respiratory: Positive for cough.  .  Negative for wheezing.    Neurological: Negative for headaches.   GI - denies nausea, vomiting or diarrhea  Neuro - denies numbness or tingling.            Objective:     /68   Pulse 71   Temp 37.1 °C (98.7 °F) (Temporal)   Resp 16   Ht 1.702 m (5' 7\")   Wt 57.2 kg (126 lb)   SpO2 98%     Physical Exam   Constitutional: patient is oriented to person, place, and time. Patient appears well-developed and well-nourished. No distress.   HENT:   Head: Normocephalic and atraumatic.   Right Ear: " External ear normal.   Left Ear: External ear normal.   Nose: Mucosal edema  present. Right sinus exhibits no maxillary sinus tenderness. Left sinus exhibits no maxillary sinus tenderness.   Mouth/Throat: Mucous membranes are normal. No oral lesions.  No posterior pharyngeal erythema.  No oropharyngeal exudate or posterior oropharyngeal edema.   Eyes: Conjunctivae and EOM are normal. Pupils are equal, round, and reactive to light. Right eye exhibits no discharge. Left eye exhibits no discharge. No scleral icterus.   Neck: Normal range of motion. Neck supple. No tracheal deviation present.   Cardiovascular: Normal rate, regular rhythm and normal heart sounds.  Exam reveals no friction rub.    Pulmonary/Chest: Effort normal. No respiratory distress. Patient has no wheezes or rhonchi. Patient has no rales.    Musculoskeletal:  exhibits no edema.   Lymphadenopathy:     Patient has no cervical adenopathy.      Neurological: patient is alert and oriented to person, place, and time.   Skin: Skin is warm and dry. No rash noted. No erythema.   Psychiatric: patient  has a normal mood and affect.  behavior is normal.   Nursing note and vitals reviewed.              Assessment/Plan:      1. Influenza B   PCR positive  - POCT CEPHEID COV-2, FLU A/B, RSV - PCR  - benzonatate (TESSALON) 200 MG capsule; Take 1 Capsule by mouth 3 times a day as needed for Cough.  Dispense: 45 Capsule; Refill: 0  - oseltamivir (TAMIFLU) 75 MG Cap; Take 1 Capsule by mouth 2 times a day for 5 days.  Dispense: 10 Capsule; Refill: 0           Differential diagnosis, natural history, supportive care, and indications for immediate follow-up discussed. All questions answered. Patient agrees with the plan of care.     Follow-up as needed if symptoms worsen or fail to improve to PCP, Urgent care or Emergency Room.     I have personally reviewed prior external notes and test results pertinent to today's visit.  I have independently reviewed and interpreted all  diagnostics ordered during this urgent care acute visit.

## 2024-01-14 NOTE — LETTER
January 14, 2024         Patient: Leighann Wolf   YOB: 1980   Date of Visit: 1/14/2024           To Whom it May Concern:    Leighann Wolf was seen in my clinic on 1/14/2024.       Please excuse absence due to illness.       If you have any questions or concerns, please don't hesitate to call.        Sincerely,           Vikash Alejandre M.D.  Electronically Signed

## 2024-01-15 DIAGNOSIS — J10.1 INFLUENZA B: ICD-10-CM

## 2024-01-15 RX ORDER — OSELTAMIVIR PHOSPHATE 75 MG/1
75 CAPSULE ORAL 2 TIMES DAILY
Qty: 10 CAPSULE | Refills: 0 | Status: SHIPPED | OUTPATIENT
Start: 2024-01-15 | End: 2024-01-20

## 2024-03-28 ENCOUNTER — HOSPITAL ENCOUNTER (OUTPATIENT)
Dept: LAB | Facility: MEDICAL CENTER | Age: 44
End: 2024-03-28
Attending: FAMILY MEDICINE
Payer: COMMERCIAL

## 2024-03-28 ENCOUNTER — OFFICE VISIT (OUTPATIENT)
Dept: MEDICAL GROUP | Facility: PHYSICIAN GROUP | Age: 44
End: 2024-03-28
Payer: COMMERCIAL

## 2024-03-28 VITALS
WEIGHT: 125 LBS | HEIGHT: 67 IN | DIASTOLIC BLOOD PRESSURE: 70 MMHG | BODY MASS INDEX: 19.62 KG/M2 | OXYGEN SATURATION: 100 % | SYSTOLIC BLOOD PRESSURE: 110 MMHG | TEMPERATURE: 97.8 F | RESPIRATION RATE: 16 BRPM | HEART RATE: 68 BPM

## 2024-03-28 DIAGNOSIS — D64.9 ANEMIA, UNSPECIFIED TYPE: ICD-10-CM

## 2024-03-28 DIAGNOSIS — E55.9 VITAMIN D DEFICIENCY: ICD-10-CM

## 2024-03-28 DIAGNOSIS — Z11.59 ENCOUNTER FOR HEPATITIS C SCREENING TEST FOR LOW RISK PATIENT: ICD-10-CM

## 2024-03-28 DIAGNOSIS — Z12.31 ENCOUNTER FOR SCREENING MAMMOGRAM FOR MALIGNANT NEOPLASM OF BREAST: ICD-10-CM

## 2024-03-28 DIAGNOSIS — G89.29 CHRONIC LEFT SHOULDER PAIN: ICD-10-CM

## 2024-03-28 DIAGNOSIS — R53.82 CHRONIC FATIGUE: ICD-10-CM

## 2024-03-28 DIAGNOSIS — E78.5 DYSLIPIDEMIA: ICD-10-CM

## 2024-03-28 DIAGNOSIS — Z30.9 ENCOUNTER FOR CONTRACEPTIVE MANAGEMENT, UNSPECIFIED TYPE: ICD-10-CM

## 2024-03-28 DIAGNOSIS — Z11.4 ENCOUNTER FOR SCREENING FOR HIV: ICD-10-CM

## 2024-03-28 DIAGNOSIS — M25.512 CHRONIC LEFT SHOULDER PAIN: ICD-10-CM

## 2024-03-28 DIAGNOSIS — G43.E09 CHRONIC MIGRAINE WITH AURA WITHOUT STATUS MIGRAINOSUS, NOT INTRACTABLE: ICD-10-CM

## 2024-03-28 DIAGNOSIS — Z30.45 ENCOUNTER FOR SURVEILLANCE OF TRANSDERMAL PATCH HORMONAL CONTRACEPTIVE DEVICE: ICD-10-CM

## 2024-03-28 LAB
25(OH)D3 SERPL-MCNC: 19 NG/ML (ref 30–100)
ALBUMIN SERPL BCP-MCNC: 4.6 G/DL (ref 3.2–4.9)
ALBUMIN/GLOB SERPL: 2 G/DL
ALP SERPL-CCNC: 62 U/L (ref 30–99)
ALT SERPL-CCNC: 14 U/L (ref 2–50)
ANION GAP SERPL CALC-SCNC: 14 MMOL/L (ref 7–16)
AST SERPL-CCNC: 17 U/L (ref 12–45)
BASOPHILS # BLD AUTO: 0.5 % (ref 0–1.8)
BASOPHILS # BLD: 0.05 K/UL (ref 0–0.12)
BILIRUB SERPL-MCNC: 1.3 MG/DL (ref 0.1–1.5)
BUN SERPL-MCNC: 15 MG/DL (ref 8–22)
CALCIUM ALBUM COR SERPL-MCNC: 8.6 MG/DL (ref 8.5–10.5)
CALCIUM SERPL-MCNC: 9.1 MG/DL (ref 8.5–10.5)
CHLORIDE SERPL-SCNC: 106 MMOL/L (ref 96–112)
CHOLEST SERPL-MCNC: 187 MG/DL (ref 100–199)
CO2 SERPL-SCNC: 21 MMOL/L (ref 20–33)
CREAT SERPL-MCNC: 0.42 MG/DL (ref 0.5–1.4)
EOSINOPHIL # BLD AUTO: 0.17 K/UL (ref 0–0.51)
EOSINOPHIL NFR BLD: 1.7 % (ref 0–6.9)
ERYTHROCYTE [DISTWIDTH] IN BLOOD BY AUTOMATED COUNT: 41.1 FL (ref 35.9–50)
FASTING STATUS PATIENT QL REPORTED: NORMAL
GFR SERPLBLD CREATININE-BSD FMLA CKD-EPI: 125 ML/MIN/1.73 M 2
GLOBULIN SER CALC-MCNC: 2.3 G/DL (ref 1.9–3.5)
GLUCOSE SERPL-MCNC: 101 MG/DL (ref 65–99)
HCT VFR BLD AUTO: 45.9 % (ref 37–47)
HCV AB SER QL: NORMAL
HDLC SERPL-MCNC: 54 MG/DL
HGB BLD-MCNC: 15.4 G/DL (ref 12–16)
HIV 1+2 AB+HIV1 P24 AG SERPL QL IA: NORMAL
IMM GRANULOCYTES # BLD AUTO: 0.03 K/UL (ref 0–0.11)
IMM GRANULOCYTES NFR BLD AUTO: 0.3 % (ref 0–0.9)
IRON SATN MFR SERPL: 28 % (ref 15–55)
IRON SERPL-MCNC: 83 UG/DL (ref 40–170)
LDLC SERPL CALC-MCNC: 113 MG/DL
LYMPHOCYTES # BLD AUTO: 2.82 K/UL (ref 1–4.8)
LYMPHOCYTES NFR BLD: 28 % (ref 22–41)
MCH RBC QN AUTO: 28.9 PG (ref 27–33)
MCHC RBC AUTO-ENTMCNC: 33.6 G/DL (ref 32.2–35.5)
MCV RBC AUTO: 86.3 FL (ref 81.4–97.8)
MONOCYTES # BLD AUTO: 0.3 K/UL (ref 0–0.85)
MONOCYTES NFR BLD AUTO: 3 % (ref 0–13.4)
NEUTROPHILS # BLD AUTO: 6.69 K/UL (ref 1.82–7.42)
NEUTROPHILS NFR BLD: 66.5 % (ref 44–72)
NRBC # BLD AUTO: 0 K/UL
NRBC BLD-RTO: 0 /100 WBC (ref 0–0.2)
PLATELET # BLD AUTO: 345 K/UL (ref 164–446)
PMV BLD AUTO: 9.4 FL (ref 9–12.9)
POTASSIUM SERPL-SCNC: 4 MMOL/L (ref 3.6–5.5)
PROT SERPL-MCNC: 6.9 G/DL (ref 6–8.2)
RBC # BLD AUTO: 5.32 M/UL (ref 4.2–5.4)
SODIUM SERPL-SCNC: 141 MMOL/L (ref 135–145)
TIBC SERPL-MCNC: 296 UG/DL (ref 250–450)
TRIGL SERPL-MCNC: 100 MG/DL (ref 0–149)
TSH SERPL DL<=0.005 MIU/L-ACNC: 4.73 UIU/ML (ref 0.38–5.33)
UIBC SERPL-MCNC: 213 UG/DL (ref 110–370)
VIT B12 SERPL-MCNC: 367 PG/ML (ref 211–911)
WBC # BLD AUTO: 10.1 K/UL (ref 4.8–10.8)

## 2024-03-28 PROCEDURE — 36415 COLL VENOUS BLD VENIPUNCTURE: CPT

## 2024-03-28 PROCEDURE — 83540 ASSAY OF IRON: CPT

## 2024-03-28 PROCEDURE — 83550 IRON BINDING TEST: CPT

## 2024-03-28 PROCEDURE — 87389 HIV-1 AG W/HIV-1&-2 AB AG IA: CPT

## 2024-03-28 PROCEDURE — 85025 COMPLETE CBC W/AUTO DIFF WBC: CPT

## 2024-03-28 PROCEDURE — 3078F DIAST BP <80 MM HG: CPT | Performed by: FAMILY MEDICINE

## 2024-03-28 PROCEDURE — 84443 ASSAY THYROID STIM HORMONE: CPT

## 2024-03-28 PROCEDURE — 3074F SYST BP LT 130 MM HG: CPT | Performed by: FAMILY MEDICINE

## 2024-03-28 PROCEDURE — 99214 OFFICE O/P EST MOD 30 MIN: CPT | Performed by: FAMILY MEDICINE

## 2024-03-28 PROCEDURE — 82607 VITAMIN B-12: CPT

## 2024-03-28 PROCEDURE — 80053 COMPREHEN METABOLIC PANEL: CPT

## 2024-03-28 PROCEDURE — 80061 LIPID PANEL: CPT

## 2024-03-28 PROCEDURE — 86803 HEPATITIS C AB TEST: CPT

## 2024-03-28 PROCEDURE — 82306 VITAMIN D 25 HYDROXY: CPT

## 2024-03-28 RX ORDER — SUMATRIPTAN 50 MG/1
50 TABLET, FILM COATED ORAL
Qty: 10 TABLET | Refills: 3 | Status: SHIPPED | OUTPATIENT
Start: 2024-03-28 | End: 2024-04-19 | Stop reason: SINTOL

## 2024-03-28 RX ORDER — NORELGESTROMIN AND ETHINYL ESTRADIOL 35; 150 UG/MG; UG/MG
1 PATCH TRANSDERMAL
Qty: 12 PATCH | Refills: 3 | Status: SHIPPED | OUTPATIENT
Start: 2024-03-28

## 2024-03-28 RX ORDER — GABAPENTIN 100 MG/1
100 CAPSULE ORAL 3 TIMES DAILY
Qty: 90 CAPSULE | Refills: 3 | Status: SHIPPED | OUTPATIENT
Start: 2024-03-28

## 2024-03-28 ASSESSMENT — PATIENT HEALTH QUESTIONNAIRE - PHQ9: CLINICAL INTERPRETATION OF PHQ2 SCORE: 0

## 2024-03-28 NOTE — ASSESSMENT & PLAN NOTE
Chronic daily migraines worse due to referred left shoulder pain from chronic old work injury.   Ibuprofen and tylenol have not helped.   Aura at times: flashign lights  Fly hx no migraines.   Rx for gabapentin provided.   She has checked her eyes and dental check up was normal also.   She worries about an aneurysm but no focal neurologic deficits, if migraine meds don't help can get baseline MRI brain.

## 2024-03-28 NOTE — ASSESSMENT & PLAN NOTE
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   Xrays with no fracture, but likely has some tendonitis.   Daily pain.

## 2024-03-28 NOTE — LETTER
March 28, 2024    To whom it may concern:     Leighann Wolf was seen by me in clinic, please excuse her from work 3/27/28.     Thank you,         Wyatt Lizarraga M.D.

## 2024-04-02 NOTE — PROGRESS NOTES
"Subjective:   Leighann Wolf is a 43 y.o. 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   Xrays with no fracture, but likely has some tendonitis.   Daily pain.     Chronic migraine with aura, without status migrainosus  Chronic daily migraines worse due to referred left shoulder pain from chronic old work injury.   Ibuprofen and tylenol have not helped.   Aura at times: flashign lights  Fly hx no migraines.   Rx for gabapentin provided.   She has checked her eyes and dental check up was normal also.   She worries about an aneurysm but no focal neurologic deficits, if migraine meds don't help can get baseline MRI brain.          Current medicines (including changes today)  Current Outpatient Medications   Medication Sig Dispense Refill    gabapentin (NEURONTIN) 100 MG Cap Take 1 Capsule by mouth 3 times a day. 90 Capsule 3    SUMAtriptan (IMITREX) 50 MG Tab Take 1 Tablet by mouth one time as needed for Migraine for up to 1 dose. 10 Tablet 3    norelgestromin-ethinyl estradiol (ORTHO EVRA) 150-35 MCG/24HR patch Place 1 Patch on the skin every 7 days. 12 Patch 3     No current facility-administered medications for this visit.     She  has a past medical history of Immunizations incomplete (10/8/2014).    ROS  No chest pain, no shortness of breath, no abdominal pain       Objective:     /70 (BP Location: Left arm, Patient Position: Sitting, BP Cuff Size: Adult)   Pulse 68   Temp 36.6 °C (97.8 °F) (Temporal)   Resp 16   Ht 1.702 m (5' 7\")   Wt 56.7 kg (125 lb)   SpO2 100%  Body mass index is 19.58 kg/m².   Physical Exam:  Constitutional: Alert, no distress.  Skin: Warm, dry, good turgor, no rashes in visible areas.  Eye: Equal, round and reactive, conjunctiva clear, lids normal.  ENMT: Lips without " lesions, good dentition, oropharynx clear.  Neck: Trachea midline, no masses, no thyromegaly. No cervical or supraclavicular lymphadenopathy  Respiratory: Unlabored respiratory effort, lungs clear to auscultation, no wheezes, no ronchi.  Cardiovascular: Normal S1, S2, no murmur, no edema.  Abdomen: Soft, non-tender, no masses, no hepatosplenomegaly.  Psych: Alert and oriented x3, normal affect and mood.    Assessment and Plan:   The following treatment plan was discussed    1. Chronic migraine with aura without status migrainosus, not intractable    2. Chronic left shoulder pain    3. Encounter for screening mammogram for malignant neoplasm of breast  - MA-SCREENING MAMMO BILAT W/TOMOSYNTHESIS W/CAD; Future    4. Chronic fatigue  - CBC WITH DIFFERENTIAL; Future  - Comp Metabolic Panel; Future  - TSH WITH REFLEX TO FT4; Future  - VITAMIN B12; Future    5. Dyslipidemia  - Lipid Profile; Future    6. Vitamin D deficiency  - VITAMIN D,25 HYDROXY (DEFICIENCY); Future    7. Anemia, unspecified type  - IRON/TOTAL IRON BIND; Future    8. Encounter for screening for HIV  - HIV AG/AB COMBO ASSAY SCREENING; Future    9. Encounter for hepatitis C screening test for low risk patient  - HEP C VIRUS ANTIBODY; Future    10. Encounter for surveillance of transdermal patch hormonal contraceptive device    11. Encounter for contraceptive management, unspecified type  - Referral to OB/Gyn    Other orders  - gabapentin (NEURONTIN) 100 MG Cap; Take 1 Capsule by mouth 3 times a day.  Dispense: 90 Capsule; Refill: 3  - SUMAtriptan (IMITREX) 50 MG Tab; Take 1 Tablet by mouth one time as needed for Migraine for up to 1 dose.  Dispense: 10 Tablet; Refill: 3  - norelgestromin-ethinyl estradiol (ORTHO EVRA) 150-35 MCG/24HR patch; Place 1 Patch on the skin every 7 days.  Dispense: 12 Patch; Refill: 3      Followup: Return in about 3 months (around 6/28/2024) for mri, Lab Review.

## 2024-04-08 ENCOUNTER — OFFICE VISIT (OUTPATIENT)
Dept: URGENT CARE | Facility: PHYSICIAN GROUP | Age: 44
End: 2024-04-08
Payer: COMMERCIAL

## 2024-04-08 VITALS
DIASTOLIC BLOOD PRESSURE: 68 MMHG | BODY MASS INDEX: 19.78 KG/M2 | RESPIRATION RATE: 16 BRPM | OXYGEN SATURATION: 99 % | TEMPERATURE: 97.7 F | WEIGHT: 126 LBS | HEIGHT: 67 IN | SYSTOLIC BLOOD PRESSURE: 102 MMHG | HEART RATE: 70 BPM

## 2024-04-08 DIAGNOSIS — R10.9 FLANK PAIN: ICD-10-CM

## 2024-04-08 DIAGNOSIS — J06.9 UPPER RESPIRATORY TRACT INFECTION, UNSPECIFIED TYPE: ICD-10-CM

## 2024-04-08 DIAGNOSIS — N30.90 CYSTITIS: ICD-10-CM

## 2024-04-08 LAB
APPEARANCE UR: NORMAL
BILIRUB UR STRIP-MCNC: NORMAL MG/DL
COLOR UR AUTO: YELLOW
FLUAV RNA SPEC QL NAA+PROBE: NEGATIVE
FLUBV RNA SPEC QL NAA+PROBE: NEGATIVE
GLUCOSE UR STRIP.AUTO-MCNC: NORMAL MG/DL
KETONES UR STRIP.AUTO-MCNC: 15 MG/DL
LEUKOCYTE ESTERASE UR QL STRIP.AUTO: NORMAL
NITRITE UR QL STRIP.AUTO: NORMAL
PH UR STRIP.AUTO: 7 [PH] (ref 5–8)
POCT INT CON NEG: NEGATIVE
POCT INT CON POS: POSITIVE
POCT URINE PREGNANCY TEST: NEGATIVE
PROT UR QL STRIP: NORMAL MG/DL
RBC UR QL AUTO: NORMAL
RSV RNA SPEC QL NAA+PROBE: NEGATIVE
SARS-COV-2 RNA RESP QL NAA+PROBE: NEGATIVE
SP GR UR STRIP.AUTO: 1.02
UROBILINOGEN UR STRIP-MCNC: 0.2 MG/DL

## 2024-04-08 PROCEDURE — 81025 URINE PREGNANCY TEST: CPT

## 2024-04-08 PROCEDURE — 81002 URINALYSIS NONAUTO W/O SCOPE: CPT

## 2024-04-08 PROCEDURE — 3074F SYST BP LT 130 MM HG: CPT

## 2024-04-08 PROCEDURE — 3078F DIAST BP <80 MM HG: CPT

## 2024-04-08 PROCEDURE — 0241U POCT CEPHEID COV-2, FLU A/B, RSV - PCR: CPT

## 2024-04-08 PROCEDURE — 99213 OFFICE O/P EST LOW 20 MIN: CPT

## 2024-04-08 RX ORDER — SULFAMETHOXAZOLE AND TRIMETHOPRIM 800; 160 MG/1; MG/1
1 TABLET ORAL 2 TIMES DAILY
Qty: 6 TABLET | Refills: 0 | Status: SHIPPED | OUTPATIENT
Start: 2024-04-08 | End: 2024-04-11

## 2024-04-08 ASSESSMENT — ENCOUNTER SYMPTOMS
FEVER: 0
CHILLS: 1
VOMITING: 0
ABDOMINAL PAIN: 0
BACK PAIN: 1
NAUSEA: 0

## 2024-04-08 ASSESSMENT — FIBROSIS 4 INDEX: FIB4 SCORE: 0.54

## 2024-04-08 NOTE — LETTER
CLAUDIA  [facility] [address] [address]     April 8, 2024    Patient: Leighann Wolf   YOB: 1980   Date of Visit: 4/8/2024       To Whom It May Concern:    Leighann Wolf was seen and treated in our department on 4/8/2024. Please excuse tonight 04/08    Sincerely,     ANY Castillo.

## 2024-04-08 NOTE — PROGRESS NOTES
Chief Complaint   Patient presents with    Body Aches     X 2 days with nasal congestion, S.O.B., mild sore throat, chills.    Flank Pain     (R) x 2 days.        HISTORY OF PRESENT ILLNESS: Patient is a pleasant 43 y.o. female who presents to urgent care today cold and flulike symptoms for the last day with increasing low back pain.  Patient denies any fevers, no nausea or vomiting, no pain with urination.    Patient Active Problem List    Diagnosis Date Noted    Chronic migraine with aura, without status migrainosus 03/28/2024    Chronic left shoulder pain 03/28/2024    Other hemorrhoids 10/21/2022    Well woman exam with routine gynecological exam 09/03/2021    Encounter for surveillance of injectable contraceptive 09/03/2021    Encounter for school history and physical examination 07/07/2021    Immunizations incomplete 10/08/2014    Heart murmur 09/17/2014       Allergies:Patient has no known allergies.    Current Outpatient Medications Ordered in Epic   Medication Sig Dispense Refill    sulfamethoxazole-trimethoprim (BACTRIM DS) 800-160 MG tablet Take 1 Tablet by mouth 2 times a day for 3 days. 6 Tablet 0    gabapentin (NEURONTIN) 100 MG Cap Take 1 Capsule by mouth 3 times a day. 90 Capsule 3    norelgestromin-ethinyl estradiol (ORTHO EVRA) 150-35 MCG/24HR patch Place 1 Patch on the skin every 7 days. 12 Patch 3    SUMAtriptan (IMITREX) 50 MG Tab Take 1 Tablet by mouth one time as needed for Migraine for up to 1 dose. (Patient not taking: Reported on 4/8/2024) 10 Tablet 3     No current Epic-ordered facility-administered medications on file.       Past Medical History:   Diagnosis Date    Immunizations incomplete 10/8/2014       Social History     Tobacco Use    Smoking status: Never    Smokeless tobacco: Never   Vaping Use    Vaping Use: Never used   Substance Use Topics    Alcohol use: Not Currently     Comment: quit back in 2015    Drug use: Not Currently       Family Status   Relation Name Status    Mo   "Alive    Fa  Alive     Family History   Problem Relation Age of Onset    Heart Disease Mother        Review of Systems   Constitutional:  Positive for chills and malaise/fatigue. Negative for fever.   HENT:  Positive for congestion.    Gastrointestinal:  Negative for abdominal pain, nausea and vomiting.   Genitourinary:  Negative for dysuria, frequency and urgency.   Musculoskeletal:  Positive for back pain.       Exam:  /68   Pulse 70   Temp 36.5 °C (97.7 °F) (Temporal)   Resp 16   Ht 1.702 m (5' 7\")   Wt 57.2 kg (126 lb)   SpO2 99%   Physical Exam  Vitals reviewed.   Constitutional:       Appearance: Normal appearance. She is normal weight. She is not toxic-appearing.   HENT:      Head: Normocephalic.      Right Ear: Tympanic membrane, ear canal and external ear normal.      Left Ear: Tympanic membrane, ear canal and external ear normal.      Nose: Congestion and rhinorrhea present.      Mouth/Throat:      Mouth: Mucous membranes are moist.      Pharynx: Oropharynx is clear.   Eyes:      Extraocular Movements: Extraocular movements intact.      Conjunctiva/sclera: Conjunctivae normal.      Pupils: Pupils are equal, round, and reactive to light.   Cardiovascular:      Rate and Rhythm: Normal rate and regular rhythm.      Pulses: Normal pulses.      Heart sounds: Normal heart sounds. No murmur heard.  Pulmonary:      Effort: Pulmonary effort is normal. No respiratory distress.      Breath sounds: Normal breath sounds.   Abdominal:      General: Abdomen is flat. Bowel sounds are normal.      Palpations: Abdomen is soft. There is no mass.      Tenderness: There is abdominal tenderness in the right lower quadrant and left lower quadrant. There is no right CVA tenderness, left CVA tenderness, guarding or rebound.   Musculoskeletal:         General: No swelling, tenderness, deformity or signs of injury.      Right lower leg: No edema.      Left lower leg: No edema.   Skin:     General: Skin is warm and dry. "      Capillary Refill: Capillary refill takes less than 2 seconds.      Findings: No bruising, erythema, lesion or rash.   Neurological:      General: No focal deficit present.      Mental Status: She is alert.      Motor: No weakness.   Psychiatric:         Mood and Affect: Mood normal.         Behavior: Behavior normal.         Thought Content: Thought content normal.         Judgment: Judgment normal.         Assessment/Plan:  1. Flank pain  - POCT Urinalysis    2. Upper respiratory tract infection, unspecified type  - POCT CoV-2, Flu A/B, RSV by PCR    3. Cystitis  - sulfamethoxazole-trimethoprim (BACTRIM DS) 800-160 MG tablet; Take 1 Tablet by mouth 2 times a day for 3 days.  Dispense: 6 Tablet; Refill: 0    Ongoing low back pain and flank pain as well as cold-like symptoms for the last 2 days.  Patient denies any fevers, no no nausea or vomiting, positive low back pain.  On physical exam stomach is soft and tenderness noted to the right lower quadrant, negative CVA tenderness.  POCT urinalysis shows leukocytes and blood.  I did go ahead and place patient on Bactrim as she has had an ongoing history of UTIs.  Swabbed for COVID and flu as she has cold-like symptoms today here in the office as well and is a healthcare provider advised patient to drink plenty of fluids, take Motrin and Tylenol as needed.  Patient is aware of the plan of care and agreeable at this time, encouraged them to follow-up if they continue to get worse or do not improve.     Supportive care, differential diagnoses, and indications for immediate follow-up discussed with patient.   Pathogenesis of diagnosis discussed including typical length and natural progression.   Instructed to return to clinic or nearest emergency department for any change in condition, further concerns, or worsening of symptoms.  Patient states understanding of the plan of care and discharge instructions.  Instructed to make an appointment, for follow up, with primary  care provider.    Please note that this dictation was created using voice recognition software. I have made every reasonable attempt to correct obvious errors, but I expect that there are errors of grammar and possibly content that I did not discover before finalizing the note.      Tammy KIDD

## 2024-04-19 ENCOUNTER — OFFICE VISIT (OUTPATIENT)
Dept: MEDICAL GROUP | Facility: PHYSICIAN GROUP | Age: 44
End: 2024-04-19
Payer: COMMERCIAL

## 2024-04-19 VITALS
RESPIRATION RATE: 16 BRPM | HEART RATE: 60 BPM | HEIGHT: 67 IN | BODY MASS INDEX: 19.15 KG/M2 | DIASTOLIC BLOOD PRESSURE: 70 MMHG | TEMPERATURE: 97.5 F | OXYGEN SATURATION: 98 % | WEIGHT: 122 LBS | SYSTOLIC BLOOD PRESSURE: 100 MMHG

## 2024-04-19 DIAGNOSIS — N39.0 FREQUENT UTI: ICD-10-CM

## 2024-04-19 DIAGNOSIS — R26.89 LIMP: ICD-10-CM

## 2024-04-19 DIAGNOSIS — R30.0 DYSURIA: ICD-10-CM

## 2024-04-19 DIAGNOSIS — G43.E09 CHRONIC MIGRAINE WITH AURA WITHOUT STATUS MIGRAINOSUS, NOT INTRACTABLE: ICD-10-CM

## 2024-04-19 PROCEDURE — 99214 OFFICE O/P EST MOD 30 MIN: CPT | Performed by: FAMILY MEDICINE

## 2024-04-19 PROCEDURE — 3074F SYST BP LT 130 MM HG: CPT | Performed by: FAMILY MEDICINE

## 2024-04-19 PROCEDURE — 3078F DIAST BP <80 MM HG: CPT | Performed by: FAMILY MEDICINE

## 2024-04-19 RX ORDER — CYCLOBENZAPRINE HCL 10 MG
10 TABLET ORAL 3 TIMES DAILY PRN
Qty: 90 TABLET | Refills: 3 | Status: SHIPPED | OUTPATIENT
Start: 2024-04-19

## 2024-04-19 RX ORDER — ESCITALOPRAM OXALATE 10 MG/1
10 TABLET ORAL DAILY
Qty: 90 TABLET | Refills: 3 | Status: SHIPPED | OUTPATIENT
Start: 2024-04-19

## 2024-04-19 ASSESSMENT — FIBROSIS 4 INDEX: FIB4 SCORE: 0.55

## 2024-04-19 NOTE — PROGRESS NOTES
"Subjective:   Leighann Wolf is a 44 y.o. female here today for evaluation and management of:     Chronic migraine with aura, without status migrainosus  Has not had a migraine since started gabapentin 100mg  Has left shoulder pain after she has left sinus congestion.   Could not tolerate the imitrex due to side effects.   Also felt like legs very weak temporarily.     Frequent UTI  Has more than 3 utis in a year.   She has chronic discomfort.   Recently treated with bactrim but had side effects: weakness, not sleeping, weak like she had the flu. Could not finish the course.   Will check ur culture.   Previously had multiple ur culture with skin contaminants, strep B  Possibly has interstitial cystitis. Can try amitriptyline but already on gabapentin, will await culture.       Limp  Chronic condition, causing back pain  May have leg length discrepancy  Ref to chiropractic to help evaluate and treat.            Current medicines (including changes today)  Current Outpatient Medications   Medication Sig Dispense Refill    escitalopram (LEXAPRO) 10 MG Tab Take 1 Tablet by mouth every day. 90 Tablet 3    cyclobenzaprine (FLEXERIL) 10 mg Tab Take 1 Tablet by mouth 3 times a day as needed for Muscle Spasms. 90 Tablet 3    gabapentin (NEURONTIN) 100 MG Cap Take 1 Capsule by mouth 3 times a day. 90 Capsule 3    norelgestromin-ethinyl estradiol (ORTHO EVRA) 150-35 MCG/24HR patch Place 1 Patch on the skin every 7 days. 12 Patch 3     No current facility-administered medications for this visit.     She  has a past medical history of Immunizations incomplete (10/8/2014).    ROS  No chest pain, no shortness of breath, no abdominal pain       Objective:     /70   Pulse 60   Temp 36.4 °C (97.5 °F) (Temporal)   Resp 16   Ht 1.702 m (5' 7\")   Wt 55.3 kg (122 lb)   SpO2 98%  Body mass index is 19.11 kg/m².   Physical Exam:  Constitutional: Alert, no distress.  Skin: Warm, dry, good turgor, no rashes in visible areas.  Eye: " Equal, round and reactive, conjunctiva clear, lids normal.  ENMT: Lips without lesions, good dentition, oropharynx clear.  Neck: Trachea midline, no masses, no thyromegaly. No cervical or supraclavicular lymphadenopathy  Respiratory: Unlabored respiratory effort, lungs clear to auscultation, no wheezes, no ronchi.  Cardiovascular: Normal S1, S2, no murmur, no edema.  Abdomen: Soft, non-tender, no masses, no hepatosplenomegaly.  Psych: Alert and oriented x3, normal affect and mood.    Assessment and Plan:   The following treatment plan was discussed    1. Chronic migraine with aura without status migrainosus, not intractable  - Referral to Chiropractic    2. Dysuria  - URINE CULTURE(NEW); Future    3. Frequent UTI    4. Limp  - Referral to Chiropractic    Other orders  - escitalopram (LEXAPRO) 10 MG Tab; Take 1 Tablet by mouth every day.  Dispense: 90 Tablet; Refill: 3  - cyclobenzaprine (FLEXERIL) 10 mg Tab; Take 1 Tablet by mouth 3 times a day as needed for Muscle Spasms.  Dispense: 90 Tablet; Refill: 3      Followup: Return for follow up, As Scheduled.

## 2024-04-19 NOTE — ASSESSMENT & PLAN NOTE
Has not had a migraine since started gabapentin 100mg  Has left shoulder pain after she has left sinus congestion.   Could not tolerate the imitrex due to side effects.   Also felt like legs very weak temporarily.

## 2024-04-19 NOTE — ASSESSMENT & PLAN NOTE
Has more than 3 utis in a year.   She has chronic discomfort.   Recently treated with bactrim but had side effects: weakness, not sleeping, weak like she had the flu. Could not finish the course.   Will check ur culture.   Previously had multiple ur culture with skin contaminants, strep B  Possibly has interstitial cystitis. Can try amitriptyline but already on gabapentin, will await culture.

## 2024-04-19 NOTE — ASSESSMENT & PLAN NOTE
Chronic condition, causing back pain  May have leg length discrepancy  Ref to chiropractic to help evaluate and treat.

## 2024-06-07 ENCOUNTER — TELEPHONE (OUTPATIENT)
Dept: MEDICAL GROUP | Facility: PHYSICIAN GROUP | Age: 44
End: 2024-06-07
Payer: COMMERCIAL

## 2024-06-07 NOTE — TELEPHONE ENCOUNTER
VOICEMAIL  1. Caller Name: Leighann Wolf                        Call Back Number: [phone] (home)       2. Message: pt was trying to schedule to have an IUD placed. She stated that she was hoping to get it done here with pcp    3. Patient approves office to leave a detailed voicemail/MyChart message: N\A      Phone Number Called: [phone] (home)       Call outcome: Spoke to patient regarding message below.    Message: spoke with pt and let her know that pcp does not place IUD. Informed that there was a referral placed to OB in March. Pt stated that they did get a hold of her but she wanted to check before she had to go to [facility] to get done. She will schedule with OB

## 2024-12-02 ENCOUNTER — OFFICE VISIT (OUTPATIENT)
Dept: URGENT CARE | Facility: CLINIC | Age: 44
End: 2024-12-02
Payer: COMMERCIAL

## 2024-12-02 VITALS
SYSTOLIC BLOOD PRESSURE: 118 MMHG | BODY MASS INDEX: 17.52 KG/M2 | TEMPERATURE: 98.8 F | HEART RATE: 61 BPM | HEIGHT: 67 IN | RESPIRATION RATE: 12 BRPM | WEIGHT: 111.6 LBS | OXYGEN SATURATION: 100 % | DIASTOLIC BLOOD PRESSURE: 80 MMHG

## 2024-12-02 DIAGNOSIS — A08.4 VIRAL GASTROENTERITIS: ICD-10-CM

## 2024-12-02 PROCEDURE — 3079F DIAST BP 80-89 MM HG: CPT | Performed by: NURSE PRACTITIONER

## 2024-12-02 PROCEDURE — 3074F SYST BP LT 130 MM HG: CPT | Performed by: NURSE PRACTITIONER

## 2024-12-02 PROCEDURE — 99213 OFFICE O/P EST LOW 20 MIN: CPT | Performed by: NURSE PRACTITIONER

## 2024-12-02 RX ORDER — ONDANSETRON 4 MG/1
4 TABLET, ORALLY DISINTEGRATING ORAL EVERY 6 HOURS PRN
Qty: 10 TABLET | Refills: 0 | Status: SHIPPED | OUTPATIENT
Start: 2024-12-02

## 2024-12-02 ASSESSMENT — ENCOUNTER SYMPTOMS
NAUSEA: 1
FEVER: 0
BLOOD IN STOOL: 0
MYALGIAS: 1
ABDOMINAL PAIN: 0
DIARRHEA: 1
SORE THROAT: 0
COUGH: 0
VOMITING: 1

## 2024-12-02 ASSESSMENT — FIBROSIS 4 INDEX: FIB4 SCORE: 0.55

## 2024-12-02 NOTE — LETTER
December 2, 2024         Patient: Leighann Wolf   YOB: 1980   Date of Visit: 12/2/2024           To Whom it May Concern:    Leighann Wolf was seen in my clinic on 12/2/2024. She may return to work on 12/4/2024. May return sooner if symptoms resolve.     If you have any questions or concerns, please don't hesitate to call.        Sincerely,           ANY Haque.  Electronically Signed

## 2024-12-02 NOTE — PROGRESS NOTES
Subjective:     Leighann Wolf is a 44 y.o. female who presents for Vomiting (X5 days N/V/D, fatigued, loss of appetite)      Vomiting and diarrhea have resolved. Stating she still feels weak. Has nausea and achiness with eating. Tolerating fluids. Diarrhea ended yesterday. Last vomited Thursday.     Vomiting  This is a new problem. The problem has been gradually improving. Associated symptoms include myalgias, nausea and vomiting. Pertinent negatives include no abdominal pain, coughing, fever or sore throat.       Past Medical History:   Diagnosis Date    Immunizations incomplete 10/8/2014       No past surgical history on file.    Social History     Socioeconomic History    Marital status:      Spouse name: Not on file    Number of children: Not on file    Years of education: Not on file    Highest education level: Not on file   Occupational History    Not on file   Tobacco Use    Smoking status: Never    Smokeless tobacco: Never   Vaping Use    Vaping status: Never Used   Substance and Sexual Activity    Alcohol use: Not Currently     Comment: quit back in 2015    Drug use: Not Currently    Sexual activity: Yes     Partners: Male     Birth control/protection: Injection, Implant   Other Topics Concern    Not on file   Social History Narrative    Not on file     Social Drivers of Health     Financial Resource Strain: Not on file   Food Insecurity: Not on file   Transportation Needs: Not on file   Physical Activity: Not on file   Stress: Not on file   Social Connections: Not on file   Intimate Partner Violence: Not on file   Housing Stability: Not on file        Family History   Problem Relation Age of Onset    Heart Disease Mother         No Known Allergies    Review of Systems   Constitutional:  Positive for malaise/fatigue. Negative for fever.   HENT:  Negative for sore throat.    Respiratory:  Negative for cough.    Gastrointestinal:  Positive for diarrhea, nausea and vomiting. Negative for abdominal  "pain and blood in stool.   Musculoskeletal:  Positive for myalgias.   All other systems reviewed and are negative.       Objective:   /80 (BP Location: Left arm, Patient Position: Sitting, BP Cuff Size: Small adult)   Pulse 61   Temp 37.1 °C (98.8 °F) (Temporal)   Resp 12   Ht 1.702 m (5' 7\")   Wt 50.6 kg (111 lb 9.6 oz)   SpO2 100%   BMI 17.48 kg/m²     Physical Exam  Vitals reviewed.   Constitutional:       General: She is not in acute distress.     Appearance: She is ill-appearing. She is not toxic-appearing.   HENT:      Mouth/Throat:      Mouth: Mucous membranes are moist.   Cardiovascular:      Rate and Rhythm: Normal rate and regular rhythm.   Pulmonary:      Effort: Pulmonary effort is normal.      Breath sounds: Normal breath sounds.   Abdominal:      General: Bowel sounds are increased. There is no distension.      Palpations: Abdomen is soft.      Tenderness: There is no abdominal tenderness. There is no guarding.   Skin:     General: Skin is warm and dry.   Neurological:      Mental Status: She is alert and oriented to person, place, and time.         Assessment/Plan:   1. Viral gastroenteritis  - ondansetron (ZOFRAN ODT) 4 MG TABLET DISPERSIBLE; Take 1 Tablet by mouth every 6 hours as needed for Nausea/Vomiting.  Dispense: 10 Tablet; Refill: 0    -Rest.   -Oral hydration  -[place] diet, nothing greasy or spicy.   -Tylenol for pain.   -Follow up with your Primary Care Provider.  -Return to the clinic or your primary care if not better in a couple days.  -Go to the ER if your symptoms worsen.     Follow up emergently if unable to tolerate fluids, severe or worsening abdominal pain, persistent fevers, blood in stool, weakness, elevated heart rate, chest pain.     Presents with her spouse, for acute N/V/D. No abdominal tenderness on exam. Stable vitals. Vomiting and diarrhea have resolved. Encouraged rest and hydration, with follow up for persistent or worsening symptoms.     Differential " diagnosis, natural history, supportive care, and indications for immediate follow-up discussed.

## 2024-12-02 NOTE — PATIENT INSTRUCTIONS
-Rest.   -Oral hydration  -[place] diet, nothing greasy or spicy.   -Tylenol for pain.   -Follow up with your Primary Care Provider.  -Return to the clinic or your primary care if not better in a couple days.  -Go to the ER if your symptoms worsen.     Follow up emergently if unable to tolerate fluids, severe or worsening abdominal pain, persistent fevers, blood in stool, weakness, elevated heart rate, chest pain.

## 2024-12-02 NOTE — LETTER
December 2, 2024         Patient: Leighann Wolf   YOB: 1980   Date of Visit: 12/2/2024           To Whom it May Concern:    Leighann Wolf was seen in my clinic, with her spouse (Bakari Wolf) on 12/2/2024. He may return to work on 12/3/2024.    If you have any questions or concerns, please don't hesitate to call.        Sincerely,           ANY Haque.  Electronically Signed

## 2024-12-04 ENCOUNTER — TELEPHONE (OUTPATIENT)
Dept: OBGYN | Facility: CLINIC | Age: 44
End: 2024-12-04
Payer: COMMERCIAL

## 2025-01-03 ENCOUNTER — OFFICE VISIT (OUTPATIENT)
Dept: OBGYN | Facility: CLINIC | Age: 45
End: 2025-01-03
Payer: COMMERCIAL

## 2025-01-03 ENCOUNTER — HOSPITAL ENCOUNTER (OUTPATIENT)
Facility: MEDICAL CENTER | Age: 45
End: 2025-01-03
Payer: COMMERCIAL

## 2025-01-03 VITALS — BODY MASS INDEX: 17.6 KG/M2 | WEIGHT: 112.4 LBS | SYSTOLIC BLOOD PRESSURE: 110 MMHG | DIASTOLIC BLOOD PRESSURE: 75 MMHG

## 2025-01-03 DIAGNOSIS — N89.8 VAGINAL DRYNESS: ICD-10-CM

## 2025-01-03 DIAGNOSIS — Z01.419 WELL WOMAN EXAM: ICD-10-CM

## 2025-01-03 DIAGNOSIS — Z13.29 SCREENING FOR THYROID DISORDER: ICD-10-CM

## 2025-01-03 DIAGNOSIS — E55.9 VITAMIN D DEFICIENCY: ICD-10-CM

## 2025-01-03 DIAGNOSIS — Z30.430 ENCOUNTER FOR IUD INSERTION: ICD-10-CM

## 2025-01-03 DIAGNOSIS — Z12.4 SCREENING FOR CERVICAL CANCER: ICD-10-CM

## 2025-01-03 DIAGNOSIS — N39.0 CHRONIC UTI: ICD-10-CM

## 2025-01-03 DIAGNOSIS — F52.6 GENITO-PELVIC PAIN RELATED TO VAGINAL PENETRATION: ICD-10-CM

## 2025-01-03 DIAGNOSIS — Z13.1 SCREENING FOR DIABETES MELLITUS: ICD-10-CM

## 2025-01-03 DIAGNOSIS — Z91.89 AT RISK FOR LOSS OF BONE DENSITY: ICD-10-CM

## 2025-01-03 LAB
POCT INT CON NEG: NEGATIVE
POCT INT CON POS: POSITIVE
POCT URINE PREGNANCY TEST: NEGATIVE

## 2025-01-03 PROCEDURE — 87624 HPV HI-RISK TYP POOLED RSLT: CPT

## 2025-01-03 PROCEDURE — 99459 PELVIC EXAMINATION: CPT

## 2025-01-03 PROCEDURE — 81025 URINE PREGNANCY TEST: CPT

## 2025-01-03 PROCEDURE — 99386 PREV VISIT NEW AGE 40-64: CPT

## 2025-01-03 PROCEDURE — 88142 CYTOPATH C/V THIN LAYER: CPT

## 2025-01-03 PROCEDURE — 58300 INSERT INTRAUTERINE DEVICE: CPT

## 2025-01-03 PROCEDURE — 99213 OFFICE O/P EST LOW 20 MIN: CPT | Mod: 25

## 2025-01-03 RX ORDER — ESTRADIOL 0.1 MG/G
CREAM VAGINAL
Qty: 42.5 G | Refills: 3 | Status: SHIPPED | OUTPATIENT
Start: 2025-01-03

## 2025-01-03 ASSESSMENT — FIBROSIS 4 INDEX: FIB4 SCORE: 0.55

## 2025-01-03 NOTE — PROCEDURES
IUD INSERTION PROCEDURE NOTE    Leighann Wolf  is here for IUD insertion.     Today the patient is counseled on procedure of IUD insertion. I discussed with the patient the risks of IUD including infection, risk of IUD expulsion, the risk of uterine perforation (1-1000, slightly higher while Bfing), risk of IUD migration or lost strings.  If the IUD does migrate the patient may require a separate procedure such as hysteroscopy or laparoscopy to remove or retrieve the migrated IUD. I also discussed the 0.1% risk of pregnancy with IUD use which coincides with an increased risk of ectopic pregnancy with IUD use.  We reviewed leaving the strings long enough to prevent disappearance however this may initially result in the possibility that partner can feel the IUD during intercourse; this typically resolves as the strings soften and tuck behind cervix. I also discussed the side effects of progestin-containing IUDs including decreased, irregular or absent menses and/or spotting.  All questions answered.  Informed consent is signed.  UPT negative.     /75 (BP Location: Left arm, Patient Position: Sitting, BP Cuff Size: Adult)   Wt 112 lb 6.4 oz   LMP 2024   BMI 17.60 kg/m²     Procedure  The pelvis was examined and the uterus is anteverted.  The speculum was placed.  Beta-dine was applied to the cervix.  Allis clamp was used used to grasp the cervix and provide counter traction.  The uterus was sounded with the IUD device to 6. The device was withdrawn 1cm and the IUD was then deployed and gently advanced to the fundus without complications then the device removed.  Type of IUD placed: Mirena       Aftercare discussed. She is to return for fever, worsening pelvic pain, abdominal pain, syncope, unusually heavy vaginal bleeding, suspected expulsion, foul smelling vaginal discharge, or pregnancy-like symptoms.     If the patient is comfortable she may also perform a digital self examination to  check for the strings.  Return to the office in 4-6 weeks for string check or any concerns with IUD symptoms/placement/possible expulsion.      ANY Anna.

## 2025-01-03 NOTE — PROGRESS NOTES
ANNUAL GYNECOLOGY VISIT    Chief Complaint  Annual Exam    Subjective  Leighann Wolf is a 44 y.o. female  Patient's last menstrual period was 2024. on Ortho Evra patch for contraception who presents today for Annual Exam. Pt complains that the patch is making her periods worse than when she was on depo provera and she wants to try something different. She was using Depo provera for a 10 year period with no holiday. She stopped the injections a year ago and was started on the patch. She takes a multivitamin with vitamin D and calcium in it. She reports having recent weight loss after being off the depo. She went from 126 to 112. She is wanting to get an IUD placement today.      Patient reports having vaginal dryness and pain with intercourse for a while now. She also has had chronic UTIs and is urinating through out the night. She does not currently feel like she has a UTI.         Preventive Care   Immunization History   Administered Date(s) Administered    Hepatitis A Vaccine, Adult 10/08/2014, 2015    Hepatitis B Vaccine (Adol/Adult) 10/08/2014, 2014, 2015, 2021, 2021    Influenza Seasonal Injectable - Historical Data 2013    Influenza Vac Subunit Quad Inj (Pf) 10/14/2022    Influenza Vaccine Quad Inj (Pf) 10/08/2014, 2016, 2017, 10/03/2019, 2020, 10/12/2021    Influenza split virus trivalent (PF) 2015    MMR Vaccine 10/08/2014    PFIZER THORNE CAP SARS-COV-2 VACCINATION (12+) 2022    PFIZER PURPLE CAP SARS-COV-2 VACCINATION (12+) 2021, 2021    Tdap Vaccine 10/08/2014    Tuberculin Skin Test 2014    Varicella Vaccine Live 10/08/2014     Last Pap: 2021  Any abnormal pap smears?  no  Last Mammogram: never had one, ordered today   Last Colonoscopy: n/a   Last DEXA: ordered today       Gynecology History  Current Sexual Activity: yes - one male partner   History of sexually transmitted diseases? no  Abnormal  discharge? no  Menopause: no    Menstrual History  Patient's last menstrual period was 2024.  Periods are regular every 28-30 days, lasting 6 days.   Dysmenorrhea :mild, occurring premenstrually. No intermenstrual bleeding, spotting, or discharge.  PMS symptoms?  yes - changes in mood     Contraception  Current: Ortho Evra patch   Past: depoprovera ; last injection a year ago      Cancer Risk Assessement:  Family history of:   - Breast cancer: no    - Ovarian cancer: no   - Uterine cancer: no   - Colon cancer: no    Obstetric History  OB History    Para Term  AB Living   3 2 2   1 2   SAB IAB Ectopic Molar Multiple Live Births             2      # Outcome Date GA Lbr Tyrell/2nd Weight Sex Type Anes PTL Lv   3 Term 08 38w0d  7 lb 8 oz F    ANGIE   2 Term 05 37w0d  7 lb 0.6 oz F    ANGIE   1 AB                Past Medical History  Past Medical History:   Diagnosis Date    Allergy     Anxiety     Chronic left shoulder pain     Depression     GERD (gastroesophageal reflux disease)     Immunizations incomplete 10/08/2014    Migraine with aura     Urinary tract infection     Hx of frequent UTI's       Past Surgical History  History reviewed. No pertinent surgical history.    Social History  Social History     Tobacco Use    Smoking status: Never    Smokeless tobacco: Never   Vaping Use    Vaping status: Never Used   Substance Use Topics    Alcohol use: Yes     Comment: very rare    Drug use: Not Currently        Family History  Family History   Problem Relation Age of Onset    Heart Disease Mother        Home Medications  Current Outpatient Medications   Medication Sig    norelgestromin-ethinyl estradiol (ORTHO EVRA) 150-35 MCG/24HR patch Place 1 Patch on the skin every 7 days.    ondansetron (ZOFRAN ODT) 4 MG TABLET DISPERSIBLE Take 1 Tablet by mouth every 6 hours as needed for Nausea/Vomiting.    escitalopram (LEXAPRO) 10 MG Tab Take 1 Tablet by mouth every day. (Patient not taking: Reported on  12/2/2024)    cyclobenzaprine (FLEXERIL) 10 mg Tab Take 1 Tablet by mouth 3 times a day as needed for Muscle Spasms. (Patient not taking: Reported on 12/2/2024)    gabapentin (NEURONTIN) 100 MG Cap Take 1 Capsule by mouth 3 times a day. (Patient not taking: Reported on 12/2/2024)       Allergies/Reactions  No Known Allergies    ROS  Review of Systems:  Gen: no fevers or chills, no significant weight loss or gain  Respiratory:  no cough or dyspnea  Cardiac:  no chest pain, no palpitations, no syncope  GI:  no heartburn, no abdominal pain, no nausea or vomiting  Psych: no depression or anxiety    Objective  /75 (BP Location: Left arm, Patient Position: Sitting, BP Cuff Size: Adult)   Wt 112 lb 6.4 oz   LMP 12/27/2024   BMI 17.60 kg/m²     Constitutional: The patient is well developed and well nourished.  Psychiatric: Patient is oriented to time place and person.   Skin: No rash observed.  Neck: Appears symmetric. Thyroid normal size  Respiratory: normal effort  Breast: Inspection reveals no asymetry or nipple discharge, no skin thickening, dimpling or erythema.  Palpation demonstrates no masses.  Abdomen: Soft, non-tender.  Pelvic Exam:      Vulva: external female genitalia are normal in appearance. No lesions     Urethra - no lesions, no erythema     Vagina: moist, pink, normal ruggae     Cervix: pink, smooth, no lesions, no CMT     Uterus - non-tender, normal size, shape, contour, mobile, anteverted     Ovaries: non-tender, no appreciable masses   Pap Smear performed: Yes   Chaperone Present: Jennifer Sawant MA , BIPIN Gannon  Extremities: Legs are symmetric and without tenderness. There is no edema present.      Assessment & Plan  Leighann Wolf is a 44 y.o. female who presents today for Annual Gyn Exam.      1. Health Maintenance   - Cervical cancer screening:       Pap: Collected today  - STI Screen (HIV, Syphilis, Chlamydia / Gonorrhea): declined  - MAMMOGRAM: Ordered today   - COLONOSCOPY: Not  indicated  - DEXA: Ordered today   - IMMUNIZATIONS: all desired UTD  ; Recommended Flu and vaccine each season and COVID boosters when indicated.  - TOBACCO:  denies  - DIABETES SCREEN: Ordered today  - CHOLESTEROL SCREEN: Up to date; follows with primary care   - COUNSELING: breast self exam, mammography screening, use and side effects of HRT, menopause, osteoporosis, and adequate intake of calcium and vitamin D    2. Well woman exam    - THINPREP PAP WITH HPV; Future  - MA-SCREENING MAMMO BILAT W/TOMOSYNTHESIS W/CAD; Future  - TSH WITH REFLEX TO FT4; Future  - DS-BONE DENSITY STUDY (DEXA); Future  - THYROID PEROXIDASE  (TPO) AB; Future  - estradiol (ESTRACE VAGINAL) 0.1 MG/GM vaginal cream; Apply 1g cream inside vagina using applicator nightly for 2 weeks, then twice per week thereafter  Dispense: 42.5 g; Refill: 3  - HEMOGLOBIN A1C; Future  - VITAMIN D,25 HYDROXY (DEFICIENCY); Future  - levonorgestrel (Mirena) 20 MCG/DAY IUD 1 Each  - Referral to Physical Therapy    3. Screening for cervical cancer    - THINPREP PAP WITH HPV; Future    4. Encounter for IUD insertion  - IUD Mirena inserted today. See procedure note. Patient tolerated well.   - Informed consent is signed.  UPT negative.  - Aftercare discussed. She is to return for fever, worsening pelvic pain, abdominal pain, syncope, unusually heavy vaginal bleeding, suspected expulsion, foul smelling vaginal discharge, or pregnancy-like symptoms. Do not recommend using menstrual cups or discs as it may potentially increase chances of IUD becoming dislodged.   - If the patient is comfortable she may also perform a digital self examination to check for the strings.  - Return to the office in 4-6 weeks for string check or any concerns with IUD symptoms/placement/possible expulsion.    - Consent for all Surgical, Special Diagnostic or Therapeutic Procedures  - POCT Pregnancy  - VITAMIN D,25 HYDROXY (DEFICIENCY); Future    5. Chronic UTI    - Referral to Physical  Therapy    6. Vaginal dryness    - estradiol (ESTRACE VAGINAL) 0.1 MG/GM vaginal cream; Apply 1g cream inside vagina using applicator nightly for 2 weeks, then twice per week thereafter  Dispense: 42.5 g; Refill: 3  - Referral to Physical Therapy    7. Screening for diabetes mellitus    - HEMOGLOBIN A1C; Future    8. At risk for loss of bone density  Patient on Deop provera for a 10 year period with no holiday  - DS-BONE DENSITY STUDY (DEXA); Future    9. Screening for thyroid disorder    - TSH WITH REFLEX TO FT4; Future  - THYROID PEROXIDASE  (TPO) AB; Future    10. Vitamin D deficiency    - DS-BONE DENSITY STUDY (DEXA); Future    11. Genito-pelvic pain related to vaginal penetration    - Referral to Physical Therapy        Return: In 6 weeks for string check and lab results     DOC Anna  [facility]   1/3/2025

## 2025-01-03 NOTE — PROGRESS NOTES
Patient here for annual exam.   Last pap done/results :   LMP : 12/27/2024  BCM : patch  Last Mammogram, if applicable: NA  Pt states would like to gainweight   Phone/Pharmacy verified

## 2025-01-08 LAB — THINPREP PAP, CYTOLOGY NL11781: NORMAL

## 2025-01-09 LAB
HPV I/H RISK 1 DNA SPEC QL NAA+PROBE: NOT DETECTED
SPECIMEN SOURCE: NORMAL

## 2025-01-10 ENCOUNTER — HOSPITAL ENCOUNTER (OUTPATIENT)
Dept: LAB | Facility: MEDICAL CENTER | Age: 45
End: 2025-01-10
Payer: COMMERCIAL

## 2025-01-10 DIAGNOSIS — Z13.1 SCREENING FOR DIABETES MELLITUS: ICD-10-CM

## 2025-01-10 DIAGNOSIS — Z13.29 SCREENING FOR THYROID DISORDER: ICD-10-CM

## 2025-01-10 DIAGNOSIS — Z30.430 ENCOUNTER FOR IUD INSERTION: ICD-10-CM

## 2025-01-10 DIAGNOSIS — Z01.419 WELL WOMAN EXAM: ICD-10-CM

## 2025-01-10 LAB
EST. AVERAGE GLUCOSE BLD GHB EST-MCNC: 105 MG/DL
HBA1C MFR BLD: 5.3 % (ref 4–5.6)

## 2025-01-10 PROCEDURE — 86376 MICROSOMAL ANTIBODY EACH: CPT

## 2025-01-10 PROCEDURE — 84443 ASSAY THYROID STIM HORMONE: CPT

## 2025-01-10 PROCEDURE — 83036 HEMOGLOBIN GLYCOSYLATED A1C: CPT

## 2025-01-10 PROCEDURE — 36415 COLL VENOUS BLD VENIPUNCTURE: CPT

## 2025-01-10 PROCEDURE — 82306 VITAMIN D 25 HYDROXY: CPT

## 2025-01-10 PROCEDURE — 84439 ASSAY OF FREE THYROXINE: CPT

## 2025-01-11 LAB
25(OH)D3 SERPL-MCNC: 50 NG/ML (ref 30–100)
T4 FREE SERPL-MCNC: 1.35 NG/DL (ref 0.93–1.7)
THYROPEROXIDASE AB SERPL-ACNC: 44 IU/ML (ref 0–9)
TSH SERPL DL<=0.005 MIU/L-ACNC: 6.24 UIU/ML (ref 0.38–5.33)

## 2025-01-23 ENCOUNTER — HOSPITAL ENCOUNTER (OUTPATIENT)
Dept: RADIOLOGY | Facility: MEDICAL CENTER | Age: 45
End: 2025-01-23
Payer: COMMERCIAL

## 2025-01-23 DIAGNOSIS — E55.9 VITAMIN D DEFICIENCY: ICD-10-CM

## 2025-01-23 DIAGNOSIS — Z01.419 WELL WOMAN EXAM: ICD-10-CM

## 2025-01-23 DIAGNOSIS — Z91.89 AT RISK FOR LOSS OF BONE DENSITY: ICD-10-CM

## 2025-01-23 PROCEDURE — 77080 DXA BONE DENSITY AXIAL: CPT

## 2025-01-28 ENCOUNTER — OFFICE VISIT (OUTPATIENT)
Dept: MEDICAL GROUP | Facility: PHYSICIAN GROUP | Age: 45
End: 2025-01-28
Payer: COMMERCIAL

## 2025-01-28 VITALS
TEMPERATURE: 98 F | OXYGEN SATURATION: 100 % | HEART RATE: 68 BPM | WEIGHT: 114 LBS | DIASTOLIC BLOOD PRESSURE: 78 MMHG | BODY MASS INDEX: 17.89 KG/M2 | RESPIRATION RATE: 16 BRPM | HEIGHT: 67 IN | SYSTOLIC BLOOD PRESSURE: 100 MMHG

## 2025-01-28 DIAGNOSIS — Z56.6 STRESS AT WORK: ICD-10-CM

## 2025-01-28 DIAGNOSIS — R79.89 ELEVATED TSH: ICD-10-CM

## 2025-01-28 DIAGNOSIS — R63.4 WEIGHT LOSS: ICD-10-CM

## 2025-01-28 DIAGNOSIS — R73.01 ELEVATED FASTING GLUCOSE: ICD-10-CM

## 2025-01-28 DIAGNOSIS — D64.9 ANEMIA, UNSPECIFIED TYPE: ICD-10-CM

## 2025-01-28 DIAGNOSIS — R76.8 ANTI-TPO ANTIBODIES PRESENT: ICD-10-CM

## 2025-01-28 DIAGNOSIS — K59.01 SLOW TRANSIT CONSTIPATION: ICD-10-CM

## 2025-01-28 DIAGNOSIS — M85.859 OSTEOPENIA OF NECK OF FEMUR, UNSPECIFIED LATERALITY: ICD-10-CM

## 2025-01-28 DIAGNOSIS — E78.5 DYSLIPIDEMIA: ICD-10-CM

## 2025-01-28 PROCEDURE — 99214 OFFICE O/P EST MOD 30 MIN: CPT | Performed by: FAMILY MEDICINE

## 2025-01-28 PROCEDURE — 3074F SYST BP LT 130 MM HG: CPT | Performed by: FAMILY MEDICINE

## 2025-01-28 PROCEDURE — 3078F DIAST BP <80 MM HG: CPT | Performed by: FAMILY MEDICINE

## 2025-01-28 RX ORDER — ALENDRONATE SODIUM 70 MG/1
70 TABLET ORAL
Qty: 12 TABLET | Refills: 3 | Status: SHIPPED | OUTPATIENT
Start: 2025-01-28

## 2025-01-28 SDOH — HEALTH STABILITY - MENTAL HEALTH: OTHER PHYSICAL AND MENTAL STRAIN RELATED TO WORK: Z56.6

## 2025-01-28 ASSESSMENT — PATIENT HEALTH QUESTIONNAIRE - PHQ9: CLINICAL INTERPRETATION OF PHQ2 SCORE: 0

## 2025-01-28 ASSESSMENT — FIBROSIS 4 INDEX: FIB4 SCORE: 0.55

## 2025-01-28 NOTE — ASSESSMENT & PLAN NOTE
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   Letter for work provided.   Discussed working with [employer], getting a therapist and burnout prevention strategies.

## 2025-01-28 NOTE — LETTER
January 28, 2025    To whom it may concern:     Leighann Wolf is my patient in clinic. Due to medical conditions currently I medically recommend no extra shifts over her current three, twelve hour shifts.     Thank you,               Wyatt Lizarraga M.D.

## 2025-01-28 NOTE — PROGRESS NOTES
Subjective:   Leighann Wolf is a 44 y.o. female here today for evaluation and management of:     Elevated TSH  Pt for last two months after a severe AGE with diarrhea and vomiting which lasted a week and since resolved completely has trouble maintaining her weight.   She has been drinking 2 protein shakes a day with her regular diet to prevent weight loss.   Weight stable 111 to 114 lbs.   She has no family hx of colon ca.   She has close to ostoeporosis of femur on dexa and has low vit d improved with vit d supplement, also take daily calcium. Rx for alendronate provided, discussed side effects.   Ref to endocrinology requested as she is 44 and also has symptoms of hyperthyroidism with elevated tsh.       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   Letter for work provided.   Discussed working with [employer], getting a therapist and burnout prevention strategies.     Osteopenia of neck of femur  Ref to endo  Rx fosamax  Continue ca, vit d, weight bearing exercises.            Current medicines (including changes today)  Current Outpatient Medications   Medication Sig Dispense Refill    alendronate (FOSAMAX) 70 MG Tab Take 1 Tablet by mouth every 7 days. 12 Tablet 3    estradiol (ESTRACE VAGINAL) 0.1 MG/GM vaginal cream Apply 1g cream inside vagina using applicator nightly for 2 weeks, then twice per week thereafter 42.5 g 3    ondansetron (ZOFRAN ODT) 4 MG TABLET DISPERSIBLE Take 1 Tablet by mouth every 6 hours as needed for Nausea/Vomiting. 10 Tablet 0     No current facility-administered medications for this visit.     She  has a past medical history of Allergy, Anxiety, Chronic left shoulder pain, Depression, GERD  "(gastroesophageal reflux disease), Immunizations incomplete (10/08/2014), Migraine with aura, and Urinary tract infection.    ROS  No chest pain, no shortness of breath, no abdominal pain       Objective:     /78 (BP Location: Left arm, Patient Position: Sitting, BP Cuff Size: Adult)   Pulse 68   Temp 36.7 °C (98 °F) (Temporal)   Resp 16   Ht 1.702 m (5' 7\")   Wt 51.7 kg (114 lb)   SpO2 100%  Body mass index is 17.85 kg/m².   Physical Exam:  Constitutional: Alert, no distress.  Skin: Warm, dry, good turgor, no rashes in visible areas.  Eye: Equal, round and reactive, conjunctiva clear, lids normal.  ENMT: Lips without lesions, good dentition, oropharynx clear.  Neck: Trachea midline, no masses, no thyromegaly. No cervical or supraclavicular lymphadenopathy  Respiratory: Unlabored respiratory effort, lungs clear to auscultation, no wheezes, no ronchi.  Cardiovascular: Normal S1, S2, no murmur, no edema.  Abdomen: Soft, non-tender, no masses, no hepatosplenomegaly.  Psych: Alert and oriented x3, normal affect and mood.    Assessment and Plan:   The following treatment plan was discussed    1. Weight loss  - Referral to Gastroenterology  - TSH WITH REFLEX TO FT4; Future  - Referral to Endocrinology    2. Slow transit constipation  - Referral to Gastroenterology  - TSH WITH REFLEX TO FT4; Future  - Referral to Endocrinology    3. Dyslipidemia  - Lipid Profile; Future  - Comp Metabolic Panel; Future    4. Anemia, unspecified type  - CBC WITHOUT DIFFERENTIAL; Future    5. Elevated fasting glucose  - HEMOGLOBIN A1C; Future    6. Anti-TPO antibodies present  - TSH WITH REFLEX TO FT4; Future  - Referral to Endocrinology    7. Elevated TSH  - TSH WITH REFLEX TO FT4; Future  - Referral to Endocrinology  - Comp Metabolic Panel; Future    8. Osteopenia of neck of femur, unspecified laterality  - Referral to Endocrinology    9. Stress at work    Other orders  - alendronate (FOSAMAX) 70 MG Tab; Take 1 Tablet by mouth " every 7 days.  Dispense: 12 Tablet; Refill: 3      Followup: Return in about 6 months (around 7/28/2025) for Lab Review.

## 2025-01-28 NOTE — ASSESSMENT & PLAN NOTE
Pt for last two months after a severe AGE with diarrhea and vomiting which lasted a week and since resolved completely has trouble maintaining her weight.   She has been drinking 2 protein shakes a day with her regular diet to prevent weight loss.   Weight stable 111 to 114 lbs.   She has no family hx of colon ca.   She has close to ostoeporosis of femur on dexa and has low vit d improved with vit d supplement, also take daily calcium. Rx for alendronate provided, discussed side effects.   Ref to endocrinology requested as she is 44 and also has symptoms of hyperthyroidism with elevated tsh.

## 2025-04-07 NOTE — PROGRESS NOTES
New Patient Consult Note for Endocrinology  Referred by: Wyatt Lizarraga M.D.    Reason for consult:     HPI:  Leighann Wolf is a 44 y.o. female who was referred to endocrinology service for management weight loss, osteopenia, hypothyroidism.    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     Hypothyroidism:  - was diagnosed based on labs in 1/2025 - mild TSH elevation with normal fT4 levels + elevated TPO-Abs  - fatigued, sluggish, > 1 year  - reports extreme fatigue, constipation  - FHx:mother - hyperthyroidism  - on multivitamin that possibly has biotin    Osteopenia  - noted on DEXA scan  - done in a light of prolonged use of Depo-Provera and concern of its effects on bone  - still has her periods    Past Medical History:  Patient Active Problem List    Diagnosis Date Noted    Elevated TSH 01/28/2025    Stress at work 01/28/2025    Osteopenia of neck of femur 01/28/2025    Frequent UTI 04/19/2024    Limp 04/19/2024    Chronic migraine with aura, without status migrainosus 03/28/2024    Chronic left shoulder pain 03/28/2024    Other hemorrhoids 10/21/2022    Well woman exam with routine gynecological exam 09/03/2021    Encounter for surveillance of injectable contraceptive 09/03/2021    Encounter for school history and physical examination 07/07/2021    Immunizations incomplete 10/08/2014    Heart murmur 09/17/2014     Past Surgical History:  No past surgical history on file.    Allergies:  Patient has no known allergies.    Social History:  Social History     Socioeconomic History    Marital status:      Spouse name: Not on file    Number of children: Not on file    Years of education: Not on file    Highest education level: Not on file   Occupational History    Not on file   Tobacco Use    Smoking status: Never    Smokeless tobacco:  "Never   Vaping Use    Vaping status: Never Used   Substance and Sexual Activity    Alcohol use: Yes     Comment: very rare    Drug use: Not Currently    Sexual activity: Yes     Partners: Male     Birth control/protection: Patch   Other Topics Concern    Not on file   Social History Narrative    Not on file     Social Drivers of Health     Financial Resource Strain: Not on file   Food Insecurity: Not on file   Transportation Needs: Not on file   Physical Activity: Not on file   Stress: Not on file   Social Connections: Not on file   Intimate Partner Violence: Not on file   Housing Stability: Not on file     Family History:  Family History   Problem Relation Age of Onset    Heart Disease Mother      Medications:  Current Outpatient Medications:     alendronate (FOSAMAX) 70 MG Tab, Take 1 Tablet by mouth every 7 days., Disp: 12 Tablet, Rfl: 3    estradiol (ESTRACE VAGINAL) 0.1 MG/GM vaginal cream, Apply 1g cream inside vagina using applicator nightly for 2 weeks, then twice per week thereafter, Disp: 42.5 g, Rfl: 3    ondansetron (ZOFRAN ODT) 4 MG TABLET DISPERSIBLE, Take 1 Tablet by mouth every 6 hours as needed for Nausea/Vomiting., Disp: 10 Tablet, Rfl: 0    Physical Examination:   Vital signs: /70   Pulse (!) 54   Ht 1.702 m (5' 7\") Comment: pt stated  Wt 50.3 kg (110 lb 14.4 oz)   SpO2 99%   BMI 17.37 kg/m²   General: No distress, cooperative, well dressed and well nourished.   Eyes: No scleral icterus or discharge  ENMT: Normal on external inspection of nose, lips, No nasal drainage   Neck: No abnormal masses on inspection  Resp: Normal effort  CVS: Regular rate and rhythm  Extremities: No edema bilateral extremities  Neuro: Alert and oriented  Skin: No rash, No Ulcers  Psych: Normal mood and affect    Labs:  - reviewed   Reference Range  03/27/19  11/21/22  03/28/24  01/10/25    TSH 0.380 - 5.330 uIU/mL 3.060 4.620 4.730 6.240 (H)   fT4 0.93 - 1.70 ng/dL 1.14   1.35   TPO-Abs 0.0 - 9.0 IU/mL    44.0 " (H)     Imaging:  - reviewed  DEXA scan:  Date Machine L forearm  BMD/ T-score LFN  BMD/ T-score FRAX Rx    1/23/2025  GE Prodigy unit   0.784 g/cm2 / - 1.0  0.703 g/cm2  / - 2.4  1.5% / 0.3%      Assessment and Plan:  # Subclinical hypothyroidism, secondary likely to Hashimoto's thyroiditis  - noted mild TSH elevation with normal fT4 x 1 in 1/2025  - patient reports nonspecific symptoms such as fatigue and constipation  - anxiety, agitation, moodiness, weight loss could not be explained by thyroid pathology  - discussed indications for subclinical hypothyroidism management: clear indications - clinical hypothyroidism or TSH > 10, suggested if TSH > 7 or symptomatic  - at the moment there is no strong indications for replacement therapy, will repeat TFTs  - discussed that on their own, TPO-Abs do not directly cause thyroid dysfunction or any other pathology; rather, they are markers of an underlying autoimmune process that may or may not lead to abnormal thyroid functionon   Plan:  Overall reassured the patient.   Majority of reported symptoms could not be explained by thyroid pathology.   Repeat TFTs  - FREE THYROXINE; Future  - TSH; Future    # Osteopenia  - DEXA scan was done earlier than we usually do due to concern of long-term effect of Depo-Provera +/- low BMI  - given premenopausal state and young age of the patient, no significant concerns of fractures  - will evaluate for all other possible secondary causes of osteopenia  Plan:  No treatment is required at the moment.   Obtain following labs:  - Comp Metabolic Panel; Future  - PHOSPHORUS; Future  - PTH INTACT (PTH ONLY); Future  - URINE CREATININE 24 HR; Future  - Urine Calcium 24 HR or Random; Future  - URINE SODIUM, 24 HR; Future  - FSH; Future  - LUTEINIZING HORMONE SERUM; Future  - ESTRADIOL; Future  - CELIAC DISEASE AB PANEL; Future    Repeat DEXA scan in 1/2027    # Chronic fatigue  # Low BMI, difficulty gaining weight  - it is reasonable to rule out  adrenal sufficiency, especially in the light of other autoimmune disease -Hashimoto thyroiditis  - however, no pigmentation, nausea, vomiting, muscle weakness, lightheadedness, dizziness  Plan:  Obtain 8 AM cortisol, ACTH/DHEA-S.  Patient has an appointment with GI specialist in 6/2025.    - CORTISOL; Future  - ACTH; Future  - DHEA SULFATE; Future    # Anxiety  # Mood changes  - unlikely to related to any endocrine pathology  - further evaluation by PCP/psychiatrist    RTC: 4 weeks    Total time (face-to-face and non-face-to face time):  60 min - extensive discussion of diagnoses, treatment, prognosis, medical charts, lab review, documentation.    Plan reviewed with the patient and agreed with plan.  All questions answered to patient's satisfaction.  Thank you kindly for allowing me to participate in the care plan for this patient.    Anum Tillman MD    CC:   Wyatt Lizarraga M.D.

## 2025-04-09 ENCOUNTER — OFFICE VISIT (OUTPATIENT)
Dept: ENDOCRINOLOGY | Facility: MEDICAL CENTER | Age: 45
End: 2025-04-09
Attending: STUDENT IN AN ORGANIZED HEALTH CARE EDUCATION/TRAINING PROGRAM
Payer: COMMERCIAL

## 2025-04-09 VITALS
HEIGHT: 67 IN | BODY MASS INDEX: 17.4 KG/M2 | SYSTOLIC BLOOD PRESSURE: 110 MMHG | DIASTOLIC BLOOD PRESSURE: 70 MMHG | WEIGHT: 110.9 LBS | HEART RATE: 54 BPM | OXYGEN SATURATION: 99 %

## 2025-04-09 DIAGNOSIS — E03.8 SUBCLINICAL HYPOTHYROIDISM: ICD-10-CM

## 2025-04-09 DIAGNOSIS — M85.80 OSTEOPENIA, UNSPECIFIED LOCATION: ICD-10-CM

## 2025-04-09 DIAGNOSIS — R53.82 CHRONIC FATIGUE: ICD-10-CM

## 2025-04-09 PROCEDURE — 99211 OFF/OP EST MAY X REQ PHY/QHP: CPT | Performed by: STUDENT IN AN ORGANIZED HEALTH CARE EDUCATION/TRAINING PROGRAM

## 2025-04-09 PROCEDURE — 3074F SYST BP LT 130 MM HG: CPT | Performed by: STUDENT IN AN ORGANIZED HEALTH CARE EDUCATION/TRAINING PROGRAM

## 2025-04-09 PROCEDURE — 99205 OFFICE O/P NEW HI 60 MIN: CPT | Performed by: STUDENT IN AN ORGANIZED HEALTH CARE EDUCATION/TRAINING PROGRAM

## 2025-04-09 PROCEDURE — 3078F DIAST BP <80 MM HG: CPT | Performed by: STUDENT IN AN ORGANIZED HEALTH CARE EDUCATION/TRAINING PROGRAM

## 2025-04-09 ASSESSMENT — FIBROSIS 4 INDEX: FIB4 SCORE: 0.55

## 2025-04-26 ENCOUNTER — OFFICE VISIT (OUTPATIENT)
Dept: URGENT CARE | Facility: CLINIC | Age: 45
End: 2025-04-26
Payer: COMMERCIAL

## 2025-04-26 ENCOUNTER — HOSPITAL ENCOUNTER (OUTPATIENT)
Facility: MEDICAL CENTER | Age: 45
End: 2025-04-26
Attending: NURSE PRACTITIONER
Payer: COMMERCIAL

## 2025-04-26 VITALS
OXYGEN SATURATION: 97 % | RESPIRATION RATE: 16 BRPM | DIASTOLIC BLOOD PRESSURE: 62 MMHG | WEIGHT: 111 LBS | HEIGHT: 67 IN | HEART RATE: 60 BPM | TEMPERATURE: 98.9 F | BODY MASS INDEX: 17.42 KG/M2 | SYSTOLIC BLOOD PRESSURE: 106 MMHG

## 2025-04-26 DIAGNOSIS — R35.0 URINARY FREQUENCY: ICD-10-CM

## 2025-04-26 DIAGNOSIS — Z87.440 HISTORY OF UTI: ICD-10-CM

## 2025-04-26 DIAGNOSIS — M54.50 ACUTE LOW BACK PAIN WITHOUT SCIATICA, UNSPECIFIED BACK PAIN LATERALITY: ICD-10-CM

## 2025-04-26 LAB
APPEARANCE UR: NORMAL
BILIRUB UR STRIP-MCNC: NORMAL MG/DL
COLOR UR AUTO: NORMAL
GLUCOSE UR STRIP.AUTO-MCNC: NORMAL MG/DL
KETONES UR STRIP.AUTO-MCNC: NORMAL MG/DL
LEUKOCYTE ESTERASE UR QL STRIP.AUTO: NORMAL
NITRITE UR QL STRIP.AUTO: NORMAL
PH UR STRIP.AUTO: 6.5 [PH] (ref 5–8)
POCT INT CON NEG: NEGATIVE
POCT INT CON POS: POSITIVE
POCT URINE PREGNANCY TEST: NEGATIVE
PROT UR QL STRIP: NORMAL MG/DL
RBC UR QL AUTO: NORMAL
SP GR UR STRIP.AUTO: 1.02
UROBILINOGEN UR STRIP-MCNC: 1 MG/DL

## 2025-04-26 PROCEDURE — 3078F DIAST BP <80 MM HG: CPT | Performed by: NURSE PRACTITIONER

## 2025-04-26 PROCEDURE — 81002 URINALYSIS NONAUTO W/O SCOPE: CPT | Performed by: NURSE PRACTITIONER

## 2025-04-26 PROCEDURE — 87077 CULTURE AEROBIC IDENTIFY: CPT

## 2025-04-26 PROCEDURE — 87086 URINE CULTURE/COLONY COUNT: CPT

## 2025-04-26 PROCEDURE — 81025 URINE PREGNANCY TEST: CPT | Performed by: NURSE PRACTITIONER

## 2025-04-26 PROCEDURE — 3074F SYST BP LT 130 MM HG: CPT | Performed by: NURSE PRACTITIONER

## 2025-04-26 PROCEDURE — 99214 OFFICE O/P EST MOD 30 MIN: CPT | Performed by: NURSE PRACTITIONER

## 2025-04-26 RX ORDER — LEVONORGESTREL 52 MG/1
1 INTRAUTERINE DEVICE INTRAUTERINE ONCE
COMMUNITY

## 2025-04-26 ASSESSMENT — FIBROSIS 4 INDEX: FIB4 SCORE: 0.57

## 2025-04-26 NOTE — LETTER
Leighann Wolf had an appointment with us today 4/26/2025.  She was accompanied by Bakari Wolf. Please excuse his work absence today.         Thank you,         Priscilla Yoon, PRITI.P.N.  Electronically Signed

## 2025-04-26 NOTE — PROGRESS NOTES
"Leighann Wolf is a 45 y.o. female who presents for Back Pain (Lower back pain, frequent urination, nausea, fatigued, tired x 10 days, bilateral \"kidney pain\" )    Accompanied by male adult.   HPI  This is a new problem. Leighann Wolf is a 45 y.o. patient who presents to urgent care with c/o: feeling \"bad\" for 7-10 days. Feeling tired and fatigued. Low back ache, mild nausea.  Urinary frequency. Generally not feeling well. Hx of UTI. Denies vaginal symptoms, fever, NVD. Tx tried: none. No other aggravating or alleviating factors.  Denies any other concerns at this time.       ROS See HPI    Allergies:     No Known Allergies    PMSFS Hx:  Past Medical History:   Diagnosis Date    Allergy     Anxiety     Chronic left shoulder pain     Depression     GERD (gastroesophageal reflux disease)     Immunizations incomplete 10/08/2014    Migraine with aura     Urinary tract infection     Hx of frequent UTI's     History reviewed. No pertinent surgical history.  Family History   Problem Relation Age of Onset    Heart Disease Mother      Social History     Tobacco Use    Smoking status: Never    Smokeless tobacco: Never   Substance Use Topics    Alcohol use: Not Currently     Comment: very rare         Problems:   Patient Active Problem List   Diagnosis    Immunizations incomplete    Encounter for school history and physical examination    Well woman exam with routine gynecological exam    Encounter for surveillance of injectable contraceptive    Other hemorrhoids    Heart murmur    Chronic migraine with aura, without status migrainosus    Chronic left shoulder pain    Frequent UTI    Limp    Elevated TSH    Stress at work    Osteopenia of neck of femur       Medications:   Current Outpatient Medications on File Prior to Visit   Medication Sig Dispense Refill    levonorgestrel (MIRENA, 52 MG,) 20 MCG/DAY IUD 1 Each by Intrauterine route one time.       No current facility-administered medications on file prior to visit.        " "Objective:     /62 (BP Location: Left arm, Patient Position: Sitting, BP Cuff Size: Small adult)   Pulse 60   Temp 37.2 °C (98.9 °F) (Temporal)   Resp 16   Ht 1.702 m (5' 7\")   Wt 50.3 kg (111 lb)   LMP  (Within Weeks)   SpO2 97%   BMI 17.39 kg/m²     Physical Exam  Constitutional:       General: She is not in acute distress.     Appearance: She is not ill-appearing.   Abdominal:      Tenderness: There is no right CVA tenderness or left CVA tenderness.   Skin:     Capillary Refill: Capillary refill takes less than 2 seconds.   Neurological:      Mental Status: She is alert.       Results for orders placed or performed in visit on 04/26/25   POCT Urinalysis    Collection Time: 04/26/25  1:15 PM   Result Value Ref Range    POC Color DARK YELLOW Negative    POC Appearance SLIGHTLY CLOUDY Negative    POC Glucose NEG Negative mg/dL    POC Bilirubin NEG Negative mg/dL    POC Ketones NEG Negative mg/dL    POC Specific Gravity 1.020 <1.005 - >1.030    POC Blood NEG Negative    POC Urine PH 6.5 5.0 - 8.0    POC Protein NEG Negative mg/dL    POC Urobiligen 1.0 Negative (0.2) mg/dL    POC Nitrites NEG Negative    POC Leukocyte Esterase TRACE Negative   POCT Pregnancy    Collection Time: 04/26/25  1:15 PM   Result Value Ref Range    POC Urine Pregnancy Test Negative     Internal Control Positive Positive     Internal Control Negative Negative                  Component  Ref Range & Units (hover) 1 yr ago  (4/8/24) 1 yr ago  (7/21/23) 2 yr ago  (11/4/22) 5 yr ago  (6/27/19) 6 yr ago  (3/26/19) 6 yr ago  (7/31/18) 11 yr ago  (4/24/14)   POC Color yellow yellow light yellow yellow Yellow Yellow CM   POC Appearance turbid cloudy slightly cloudy clear Clear slightly cloudy    POC Glucose neg neg neg neg Negative Neg Neg   POC Bilirubin neg neg neg neg Negative Neg Neg   POC Ketones 15 neg trace neg Negative Neg Neg   POC Specific Gravity 1.020 1.025 <=1.005 1.020 1.005 1.020 1.010   POC Blood small Moderate moderate " small Small Moderate Large   POC Urine PH 7.0 5.5 5.5 6.0 6.0 7.0 6   POC Protein neg trace neg neg Negative Neg 30   POC Urobiligen 0.2 0.2 0.2 0.2 Negative 0.2 Neg   POC Nitrites neg Neg neg neg Negative Neg Neg   POC Leukocyte Esterase mod Large large large Moderate Moderate Large            Assessment /Associated Orders:      1. Urinary frequency  POCT Urinalysis    POCT Pregnancy    URINE CULTURE(NEW)      2. History of UTI  URINE CULTURE(NEW)      3. Acute low back pain without sciatica, unspecified back pain laterality              Medical Decision Making:    Leighann Wolf is a very pleasant 45 y.o. female who is clinically stable at today's acute urgent care visit. Presents with acute problem/ concern today.    No acute distress is noted at the time of the visit.  VSS. Appropriate for outpatient care at this time.   UA: trace leukocytes otherwise negative.   HCG: negative   On review of chart pt has been treated multiple times for suspected bacterial infections. Not having frequent UTI's. - last time was 04/08/2024.   No systemic s/s of illness today. No evidence of acute pyelonephritis or renal lithiasis today.     Urine culture: pending - will treat if needed based on lab results.   Stay well hydrated  OTC AZO prn discomfort.     OTC  analgesic of choice (acetaminophen or NSAID) prn pain. Follow manufactures dosing and safety precautions.   Through shared decision making a discussion of the Dx and DDx, management options (risks,benefits, and alternatives to planned treatment), natural progression, supportive care and indications for immediate follow-up discussed. Expressed understanding and the treatment plan was agreed upon.    Questions were encouraged and answered     Follow Up:   Return to urgent care prn if new or worsening sx or if there is no improvement in condition prn.    Educated in Red flags and indications to immediately call 911 or present to the Emergency Department.           Please note  that this dictation was created using voice recognition software. I have worked with consultants from the vendor as well as technical experts from [facility] to optimize the interface. I have made every reasonable attempt to correct obvious errors, but I expect that there are errors of grammar and possibly content that I did not discover before finalizing the note.  This note was electronically signed by provider

## 2025-04-27 DIAGNOSIS — R35.0 URINARY FREQUENCY: ICD-10-CM

## 2025-04-27 DIAGNOSIS — Z87.440 HISTORY OF UTI: ICD-10-CM

## 2025-04-29 LAB
BACTERIA UR CULT: NORMAL
SIGNIFICANT IND 70042: NORMAL
SITE SITE: NORMAL
SOURCE SOURCE: NORMAL

## 2025-04-30 ENCOUNTER — RESULTS FOLLOW-UP (OUTPATIENT)
Dept: URGENT CARE | Facility: CLINIC | Age: 45
End: 2025-04-30

## 2025-05-21 ENCOUNTER — HOSPITAL ENCOUNTER (OUTPATIENT)
Dept: LAB | Facility: MEDICAL CENTER | Age: 45
End: 2025-05-21
Attending: STUDENT IN AN ORGANIZED HEALTH CARE EDUCATION/TRAINING PROGRAM
Payer: COMMERCIAL

## 2025-05-21 DIAGNOSIS — R53.82 CHRONIC FATIGUE: ICD-10-CM

## 2025-05-21 DIAGNOSIS — M85.80 OSTEOPENIA, UNSPECIFIED LOCATION: ICD-10-CM

## 2025-05-21 DIAGNOSIS — E03.8 SUBCLINICAL HYPOTHYROIDISM: ICD-10-CM

## 2025-05-21 PROCEDURE — 83970 ASSAY OF PARATHORMONE: CPT

## 2025-05-21 PROCEDURE — 82533 TOTAL CORTISOL: CPT

## 2025-05-21 PROCEDURE — 84439 ASSAY OF FREE THYROXINE: CPT

## 2025-05-21 PROCEDURE — 36415 COLL VENOUS BLD VENIPUNCTURE: CPT

## 2025-05-21 PROCEDURE — 84100 ASSAY OF PHOSPHORUS: CPT

## 2025-05-21 PROCEDURE — 82166 ASSAY ANTI-MULLERIAN HORM: CPT

## 2025-05-21 PROCEDURE — 83001 ASSAY OF GONADOTROPIN (FSH): CPT

## 2025-05-21 PROCEDURE — 80053 COMPREHEN METABOLIC PANEL: CPT

## 2025-05-21 PROCEDURE — 86364 TISS TRNSGLTMNASE EA IG CLAS: CPT

## 2025-05-21 PROCEDURE — 83002 ASSAY OF GONADOTROPIN (LH): CPT

## 2025-05-21 PROCEDURE — 82024 ASSAY OF ACTH: CPT

## 2025-05-21 PROCEDURE — 84443 ASSAY THYROID STIM HORMONE: CPT

## 2025-05-21 PROCEDURE — 82627 DEHYDROEPIANDROSTERONE: CPT

## 2025-05-21 PROCEDURE — 82670 ASSAY OF TOTAL ESTRADIOL: CPT

## 2025-05-22 ENCOUNTER — HOSPITAL ENCOUNTER (OUTPATIENT)
Facility: MEDICAL CENTER | Age: 45
End: 2025-05-22
Attending: STUDENT IN AN ORGANIZED HEALTH CARE EDUCATION/TRAINING PROGRAM
Payer: COMMERCIAL

## 2025-05-22 LAB
ALBUMIN SERPL BCP-MCNC: 4.3 G/DL (ref 3.2–4.9)
ALBUMIN/GLOB SERPL: 1.8 G/DL
ALP SERPL-CCNC: 73 U/L (ref 30–99)
ALT SERPL-CCNC: 18 U/L (ref 2–50)
ANION GAP SERPL CALC-SCNC: 14 MMOL/L (ref 7–16)
AST SERPL-CCNC: 19 U/L (ref 12–45)
BILIRUB SERPL-MCNC: 1.4 MG/DL (ref 0.1–1.5)
BUN SERPL-MCNC: 12 MG/DL (ref 8–22)
CALCIUM ALBUM COR SERPL-MCNC: 8.8 MG/DL (ref 8.5–10.5)
CALCIUM SERPL-MCNC: 9 MG/DL (ref 8.5–10.5)
CHLORIDE SERPL-SCNC: 102 MMOL/L (ref 96–112)
CO2 SERPL-SCNC: 21 MMOL/L (ref 20–33)
CORTIS SERPL-MCNC: 3.8 UG/DL (ref 0–23)
CREAT SERPL-MCNC: 0.47 MG/DL (ref 0.5–1.4)
DHEA-S SERPL-MCNC: 72.7 UG/DL (ref 60.9–337)
ESTRADIOL SERPL-MCNC: 625 PG/ML
FSH SERPL-ACNC: 3.9 MIU/ML
GFR SERPLBLD CREATININE-BSD FMLA CKD-EPI: 121 ML/MIN/1.73 M 2
GLOBULIN SER CALC-MCNC: 2.4 G/DL (ref 1.9–3.5)
GLUCOSE SERPL-MCNC: 92 MG/DL (ref 65–99)
LH SERPL-ACNC: 5 IU/L
PHOSPHATE SERPL-MCNC: 3.9 MG/DL (ref 2.5–4.5)
POTASSIUM SERPL-SCNC: 4.2 MMOL/L (ref 3.6–5.5)
PROT SERPL-MCNC: 6.7 G/DL (ref 6–8.2)
PTH-INTACT SERPL-MCNC: 38.5 PG/ML (ref 14–72)
SODIUM SERPL-SCNC: 137 MMOL/L (ref 135–145)
T4 FREE SERPL-MCNC: 1.53 NG/DL (ref 0.93–1.7)
TSH SERPL-ACNC: 6.27 UIU/ML (ref 0.38–5.33)

## 2025-05-22 PROCEDURE — 82570 ASSAY OF URINE CREATININE: CPT

## 2025-05-22 PROCEDURE — 81050 URINALYSIS VOLUME MEASURE: CPT

## 2025-05-22 PROCEDURE — 82340 ASSAY OF CALCIUM IN URINE: CPT

## 2025-05-22 PROCEDURE — 84300 ASSAY OF URINE SODIUM: CPT

## 2025-05-23 DIAGNOSIS — M85.80 OSTEOPENIA, UNSPECIFIED LOCATION: ICD-10-CM

## 2025-05-23 LAB
ACTH PLAS-MCNC: 6.5 PG/ML (ref 7.2–63.3)
CREAT 24H UR-MSRATE: 592 MG/24 HR (ref 800–1800)
CREAT UR-MCNC: 74 MG/DL
SPECIMEN VOL UR: 800 ML
TTG IGA SER IA-ACNC: 1.26 FLU (ref 0–4.99)

## 2025-05-25 LAB — MIS SERPL-MCNC: 0.53 NG/ML

## 2025-05-26 LAB
CALCIUM 24H UR-MCNC: 8.8 MG/DL
CALCIUM 24H UR-MRATE: 70 MG/D (ref 100–250)
CALCIUM/CREAT 24H UR: 107 MG/G (ref 20–300)
COLLECT DURATION TIME SPEC: 24 HR
CREAT 24H UR-MCNC: 82 MG/DL
SODIUM 24H UR-SCNC: 96 MMOL/L
SODIUM 24H UR-SRATE: 77 MMOL/D (ref 51–286)
SPECIMEN VOL ?TM UR: 800 ML

## 2025-05-26 NOTE — PROGRESS NOTES
"Follow up Endocrinology Visit  Initial consult/last visit on: 4/9/25  Referred by: Wyatt Lizarraga M.D.    Chief complaint:  Leighann Wolf, 45 y.o., female, who is here for follow up for management weight loss, osteopenia, hypothyroidism.    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     Hypothyroidism:  - was diagnosed based on labs in 1/2025 - mild TSH elevation with normal fT4 levels + elevated TPO-Abs  - fatigued, sluggish, > 1 year  - reports extreme fatigue, constipation  - FHx:mother - hyperthyroidism  - on multivitamin that possibly has biotin    Osteopenia  - noted on DEXA scan  - done in a light of prolonged use of Depo-Provera and concern of its effects on bone  - still has her periods    Medications:  Current Outpatient Medications:     levonorgestrel (MIRENA, 52 MG,) 20 MCG/DAY IUD, 1 Each by Intrauterine route one time., Disp: , Rfl:     Physical Examination:   Vital signs: /60   Pulse (!) 56   Ht 1.702 m (5' 7\")   Wt 49.2 kg (108 lb 8 oz)   SpO2 100%   BMI 16.99 kg/m²   General: No distress, cooperative, well dressed and well nourished. No cushingoid features.   Resp: Normal effort  CVS: Regular rate and rhythm  Extremities: No edema bilateral extremities  Neuro: Alert and oriented  Skin: No rash, No Ulcers  Psych: Normal mood and affect    Labs:  - reviewed  Hypothyroidism work up:   Reference Range  03/27/19  11/21/22  03/28/24  01/10/25  5/21/25   TSH 0.380 - 5.330 uIU/mL 3.060 4.620 4.730 6.240 (H) 6.270 (H)   fT4 0.93 - 1.70 ng/dL 1.14   1.35 1.53   TPO-Abs 0.0 - 9.0 IU/mL    44.0 (H)      AI rule out:   Reference Range  05/21/25 07:56   Cortisol 0.0 - 23.0 ug/dL 3.8   ACTH 7.2 - 63.3 pg/mL 6.5 (L) "   DHEAS 60.9 - 337.0 ug/dL 72.7     Osteopenia work up:   Reference Range  03/28/24  01/10/25  05/21/25    Hb 12.0 - 16.0 g/dL 15.4     Hct 37.0 - 47.0 % 45.9     Sodium 135 - 145 mmol/L 141  137   Creatinine 0.50 - 1.40 mg/dL 0.42 (L)  0.47 (L)   GFR (CKD-EPI) >60 mL/min/1.73 m 2 125  121   Calcium 8.5 - 10.5 mg/dL 9.1  9.0   Correct Calcium 8.5 - 10.5 mg/dL 8.6  8.8   AST(SGOT) 12 - 45 U/L 17  19   ALT(SGPT) 2 - 50 U/L 14  18   ALP 30 - 99 U/L 62  73   Albumin 3.2 - 4.9 g/dL 4.6  4.3   HbA1C 4.0 - 5.6 %  5.3    Vit D 30 - 100 ng/mL 19 (L) 50    t-TG IgA 0.00 - 4.99 FLU   1.26     24 hr urine:   Reference Range  05/22/25    Total Volume mL 800   Creatinine  800 - 1800 mg/24 Hr 592 (L)   Calcium 100 - 250 mg/d 70 (L)   Calcium Creat Ratio  20 - 300 mg/g 107   Sodium 51 - 286 mmol/d 592 (L)      Reference Range  05/21/25   AMH <=6.282 ng/mL 0.526   Estradiol pg/mL 625.0   FSH mIU/mL 3.9     Imaging:  - reviewed  DEXA scan:  Date Machine L forearm  BMD/ T-score LFN  BMD/ T-score FRAX Rx    1/23/2025  [employer] Prodigy unit   0.784 g/cm2 / - 1.0  0.703 g/cm2  / - 2.4  1.5% / 0.3%      Assessment and Plan:  # Subclinical hypothyroidism, secondary likely to Hashimoto's thyroiditis  - persistent  - positive markers of Hashimoto's thyroiditis  - TSH is < 7, concerns of AI -> hold on LT4 replacement therapy  Prior notes:  4/9/25  - noted mild TSH elevation with normal fT4 x 1 in 1/2025  - patient reports nonspecific symptoms such as fatigue and constipation  - anxiety, agitation, moodiness, weight loss could not be explained by thyroid pathology  - discussed indications for subclinical hypothyroidism management: clear indications - clinical hypothyroidism or TSH > 10, suggested if TSH > 7 or symptomatic  - at the moment there is no strong indications for replacement therapy, will repeat TFTs  - discussed that on their own, TPO-Abs do not directly cause thyroid dysfunction or any other pathology; rather, they are markers of an  underlying autoimmune process that may or may not lead to abnormal thyroid functionon   Plan:  Overall reassured the patient.   Majority of reported symptoms could not be explained by thyroid pathology.   Repeat TFTs in 6 mo  - FREE THYROXINE; Future  - TSH; Future    # Osteopenia  - work up for secondary causes of low BMD - negative  - clinical suspicion for Cushing's syndrome is very low -> no testing  - main factor - low BMI  - urine Ca is low but urine collection was sub-optimal and could not be trusted as urine creatinine is low - likely underestimating urine CA  Prior notes:  4/9/25  - DEXA scan was done earlier than we usually do due to concern of long-term effect of Depo-Provera +/- low BMI  - given premenopausal state and young age of the patient, no significant concerns of fractures  - will evaluate for all other possible secondary causes of osteopenia  Plan:  No treatment is required at the moment.   Repeat DEXA scan in 1/2027    # Chronic fatigue  # Low BMI, difficulty gaining weight  - noted low am cortisol and ACTH - concerning of secondary adrenal insufficiency  - patient denies Hx of steroid intake, severe headache (even had history of migraines that lately subsided), peripheral vision changes  - proceed with ACTH stim test, evaluate pituitary function  Prior notes:  4/9/25  - it is reasonable to rule out adrenal sufficiency, especially in the light of other autoimmune disease -Hashimoto thyroiditis  - however, no pigmentation, nausea, vomiting, muscle weakness, lightheadedness, dizziness  Plan:  ACTH stim test.   Obtain following labs:  - IGF-1 SOMATOMEDIN; Future  - PROLACTIN; Future  - FSH; Future  - LUTEINIZING HORMONE SERUM; Future  - ESTRADIOL; Future  Patient has an appointment with GI specialist in 6/2025.    # Anxiety  # Mood changes  - unlikely to related to any endocrine pathology  - further evaluation by PCP/psychiatrist    RTC: 3 weeks    Total time (face-to-face and non-face-to face  time): 40 min - extensive discussion of diagnoses, treatment, prognosis,  lab review, documentation.    Plan reviewed with the patient and agreed with plan.  All questions answered to patient's satisfaction.  Thank you kindly for allowing me to participate in the care plan for this patient.    Anum Tillman MD    CC:   Wyatt Lizarraga M.D.

## 2025-05-29 ENCOUNTER — OFFICE VISIT (OUTPATIENT)
Dept: ENDOCRINOLOGY | Facility: MEDICAL CENTER | Age: 45
End: 2025-05-29
Attending: STUDENT IN AN ORGANIZED HEALTH CARE EDUCATION/TRAINING PROGRAM
Payer: COMMERCIAL

## 2025-05-29 VITALS
DIASTOLIC BLOOD PRESSURE: 60 MMHG | SYSTOLIC BLOOD PRESSURE: 108 MMHG | HEART RATE: 56 BPM | OXYGEN SATURATION: 100 % | WEIGHT: 108.5 LBS | HEIGHT: 67 IN | BODY MASS INDEX: 17.03 KG/M2

## 2025-05-29 DIAGNOSIS — R53.82 CHRONIC FATIGUE: ICD-10-CM

## 2025-05-29 DIAGNOSIS — R79.89 LOW SERUM CORTISOL LEVEL: Primary | ICD-10-CM

## 2025-05-29 DIAGNOSIS — E03.8 SUBCLINICAL HYPOTHYROIDISM: ICD-10-CM

## 2025-05-29 DIAGNOSIS — M85.80 OSTEOPENIA, UNSPECIFIED LOCATION: ICD-10-CM

## 2025-05-29 PROCEDURE — 99212 OFFICE O/P EST SF 10 MIN: CPT | Performed by: STUDENT IN AN ORGANIZED HEALTH CARE EDUCATION/TRAINING PROGRAM

## 2025-05-29 RX ORDER — 0.9 % SODIUM CHLORIDE 0.9 %
3 VIAL (ML) INJECTION PRN
OUTPATIENT
Start: 2025-05-30

## 2025-05-29 RX ORDER — 0.9 % SODIUM CHLORIDE 0.9 %
VIAL (ML) INJECTION PRN
OUTPATIENT
Start: 2025-05-30

## 2025-05-29 RX ORDER — 0.9 % SODIUM CHLORIDE 0.9 %
10 VIAL (ML) INJECTION PRN
OUTPATIENT
Start: 2025-05-30

## 2025-05-29 RX ORDER — COSYNTROPIN 0.25 MG/ML
250 INJECTION, POWDER, FOR SOLUTION INTRAMUSCULAR; INTRAVENOUS ONCE
OUTPATIENT
Start: 2025-05-30 | End: 2025-05-30

## 2025-05-29 ASSESSMENT — FIBROSIS 4 INDEX: FIB4 SCORE: 0.56

## 2025-06-11 ENCOUNTER — HOSPITAL ENCOUNTER (OUTPATIENT)
Dept: LAB | Facility: MEDICAL CENTER | Age: 45
End: 2025-06-11
Attending: STUDENT IN AN ORGANIZED HEALTH CARE EDUCATION/TRAINING PROGRAM
Payer: COMMERCIAL

## 2025-06-11 DIAGNOSIS — R79.89 LOW SERUM CORTISOL LEVEL: ICD-10-CM

## 2025-06-11 LAB
ESTRADIOL SERPL-MCNC: 158 PG/ML
FSH SERPL-ACNC: 5.4 MIU/ML
LH SERPL-ACNC: 3.4 IU/L
PROLACTIN SERPL-MCNC: 11.8 NG/ML (ref 2.8–26)

## 2025-06-11 PROCEDURE — 83001 ASSAY OF GONADOTROPIN (FSH): CPT

## 2025-06-11 PROCEDURE — 84305 ASSAY OF SOMATOMEDIN: CPT

## 2025-06-11 PROCEDURE — 82670 ASSAY OF TOTAL ESTRADIOL: CPT

## 2025-06-11 PROCEDURE — 83002 ASSAY OF GONADOTROPIN (LH): CPT

## 2025-06-11 PROCEDURE — 84146 ASSAY OF PROLACTIN: CPT

## 2025-06-11 PROCEDURE — 36415 COLL VENOUS BLD VENIPUNCTURE: CPT

## 2025-06-13 ENCOUNTER — OUTPATIENT INFUSION SERVICES (OUTPATIENT)
Facility: MEDICAL CENTER | Age: 45
End: 2025-06-13
Attending: STUDENT IN AN ORGANIZED HEALTH CARE EDUCATION/TRAINING PROGRAM
Payer: COMMERCIAL

## 2025-06-13 VITALS
BODY MASS INDEX: 17.15 KG/M2 | HEART RATE: 55 BPM | TEMPERATURE: 98.1 F | RESPIRATION RATE: 18 BRPM | HEIGHT: 67 IN | WEIGHT: 109.24 LBS | OXYGEN SATURATION: 100 % | DIASTOLIC BLOOD PRESSURE: 69 MMHG | SYSTOLIC BLOOD PRESSURE: 117 MMHG

## 2025-06-13 DIAGNOSIS — R79.89 LOW SERUM CORTISOL LEVEL: Primary | ICD-10-CM

## 2025-06-13 LAB
CORTIS SERPL-MCNC: 17.4 UG/DL (ref 0–23)
CORTIS SERPL-MCNC: 21.5 UG/DL (ref 0–23)
CORTIS SERPL-MCNC: 4.2 UG/DL (ref 0–23)
IGF-I SERPL-MCNC: 198 NG/ML (ref 71–258)
IGF-I Z-SCORE SERPL: 1

## 2025-06-13 PROCEDURE — 82533 TOTAL CORTISOL: CPT | Mod: 91

## 2025-06-13 PROCEDURE — 700111 HCHG RX REV CODE 636 W/ 250 OVERRIDE (IP): Mod: JZ | Performed by: STUDENT IN AN ORGANIZED HEALTH CARE EDUCATION/TRAINING PROGRAM

## 2025-06-13 PROCEDURE — 96374 THER/PROPH/DIAG INJ IV PUSH: CPT

## 2025-06-13 PROCEDURE — 82024 ASSAY OF ACTH: CPT

## 2025-06-13 RX ORDER — 0.9 % SODIUM CHLORIDE 0.9 %
3 VIAL (ML) INJECTION PRN
Status: CANCELLED | OUTPATIENT
Start: 2025-06-13

## 2025-06-13 RX ORDER — COSYNTROPIN 0.25 MG/ML
250 INJECTION, POWDER, FOR SOLUTION INTRAMUSCULAR; INTRAVENOUS ONCE
Status: COMPLETED | OUTPATIENT
Start: 2025-06-13 | End: 2025-06-13

## 2025-06-13 RX ORDER — 0.9 % SODIUM CHLORIDE 0.9 %
10 VIAL (ML) INJECTION PRN
Status: CANCELLED | OUTPATIENT
Start: 2025-06-13

## 2025-06-13 RX ORDER — COSYNTROPIN 0.25 MG/ML
250 INJECTION, POWDER, FOR SOLUTION INTRAMUSCULAR; INTRAVENOUS ONCE
Status: CANCELLED | OUTPATIENT
Start: 2025-06-13 | End: 2025-06-13

## 2025-06-13 RX ORDER — 0.9 % SODIUM CHLORIDE 0.9 %
VIAL (ML) INJECTION PRN
Status: CANCELLED | OUTPATIENT
Start: 2025-06-13

## 2025-06-13 RX ADMIN — COSYNTROPIN 250 MCG: 0.25 INJECTION, POWDER, LYOPHILIZED, FOR SOLUTION INTRAMUSCULAR; INTRAVENOUS at 07:55

## 2025-06-13 ASSESSMENT — FIBROSIS 4 INDEX: FIB4 SCORE: 0.56

## 2025-06-13 NOTE — PROGRESS NOTES
Pt here for Cosyntropin Stim test accompanied by her . Assessment completed. POC discussed. Pt acknowledged understanding. PIV to LAC, pt roger well. Baseline lab samples collected and verified in process with lab. Cosyntropin education provided verbally and in writing, all questions answered to pt's satisfaction. Cosyntropin administered per MAR. Pt denies any adverse effects thus far, bottled water and warm blanket and pillow provided, pt resting comfortably in chair with feet elevated,  sitting at chairside, all needs met, call light education reinforced. 30-minute post Cosyntropin lab sample collected and sent to lab, pt continues to deny adverse effects, no needs at this time. 60-minute post Cosyntropin lab sample collected and sent to lab, pt reports feeling perfectly fine, just hungry, stating she and  will go eat now that testing is complete. PIV d/c'd, tip intact, sterile gauze and [place] to site, pt roger well. Advised pt no need to schedule future appt with IS, pt will f/up with Endocrinologist, pt verbalized understanding and agreement. Pt discharged to self care, ambulatory and in NAD, in possession of all personal belongings and without incident,  to drive her home.

## 2025-06-14 LAB — ACTH PLAS-MCNC: 5.7 PG/ML (ref 7.2–63.3)

## 2025-06-16 NOTE — PROGRESS NOTES
"Follow up Endocrinology Visit  Initial consult on: 4/9/25  Last visit on: 5/29/25  Referred by: Wyatt Lizarraga M.D.    Chief complaint:  Leighann Wolf, 45 y.o., female, who is here for follow up for management weight loss, osteopenia, hypothyroidism.    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:  - was diagnosed based on labs in 1/2025 - mild TSH elevation with normal fT4 levels + elevated TPO-Abs  - fatigued, sluggish, > 1 year  - reports extreme fatigue, constipation  - FHx:mother - hyperthyroidism  - on multivitamin that possibly has biotin    Osteopenia  - noted on DEXA scan  - done in a light of prolonged use of Depo-Provera and concern of its effects on bone  - still has her periods    Medications:  Current Outpatient Medications:     levonorgestrel (MIRENA, 52 MG,) 20 MCG/DAY IUD, 1 Each by Intrauterine route one time., Disp: , Rfl:     Physical Examination:   Vital signs: /60   Pulse (!) 56   Ht 1.702 m (5' 7\") Comment: pt stated  Wt 49.4 kg (108 lb 12.8 oz)   SpO2 100%   BMI 17.04 kg/m²   General: No distress, cooperative, well dressed and well nourished. No cushingoid features.   Resp: Normal effort  CVS: Regular rate and rhythm  Extremities: No edema bilateral extremities  Neuro: Alert and oriented  Skin: No rash, No Ulcers  Psych: Normal mood and affect    Labs:  - reviewed  Hypothyroidism work up:   Reference Range  03/27/19  11/21/22  03/28/24  01/10/25  5/21/25    TSH 0.380 - 5.330 uIU/mL " 3.060 4.620 4.730 6.240 (H) 6.270 (H)    fT4 0.93 - 1.70 ng/dL 1.14   1.35 1.53    TPO-Abs 0.0 - 9.0 IU/mL    44.0 (H)     LT4 Mcg/day      50     AI rule out:   Reference Range  05/21/25 07:56   Cortisol 0.0 - 23.0 ug/dL 3.8   ACTH 7.2 - 63.3 pg/mL 6.5 (L)   DHEAS 60.9 - 337.0 ug/dL 72.7     ACTH stim test:    Reference Range  06/13/25 07:30 06/13/25 08:30 06/13/25 09:00   Cortisol 0.0 - 23.0 ug/dL 4.2 17.4 21.5      Reference Range  06/11/25    Estradiol pg/mL 158.0   FSH mIU/mL 5.4   IGF1 71 - 258 ng/mL 198   IGF-1 Z Score   1.0   LH IU/L 3.4   Prolactin 2.80 - 26.00 ng/mL 11.80     Osteopenia work up:   Reference Range  03/28/24  01/10/25  05/21/25    Hb 12.0 - 16.0 g/dL 15.4     Hct 37.0 - 47.0 % 45.9     Sodium 135 - 145 mmol/L 141  137   Creatinine 0.50 - 1.40 mg/dL 0.42 (L)  0.47 (L)   GFR (CKD-EPI) >60 mL/min/1.73 m 2 125  121   Calcium 8.5 - 10.5 mg/dL 9.1  9.0   Correct Calcium 8.5 - 10.5 mg/dL 8.6  8.8   AST(SGOT) 12 - 45 U/L 17  19   ALT(SGPT) 2 - 50 U/L 14  18   ALP 30 - 99 U/L 62  73   Albumin 3.2 - 4.9 g/dL 4.6  4.3   HbA1C 4.0 - 5.6 %  5.3    Vit D 30 - 100 ng/mL 19 (L) 50    t-TG IgA 0.00 - 4.99 FLU   1.26     24 hr urine:   Reference Range  05/22/25    Total Volume mL 800   Creatinine  800 - 1800 mg/24 Hr 592 (L)   Calcium 100 - 250 mg/d 70 (L)   Calcium Creat Ratio  20 - 300 mg/g 107   Sodium 51 - 286 mmol/d 592 (L)      Reference Range  05/21/25   AMH <=6.282 ng/mL 0.526   Estradiol pg/mL 625.0   FSH mIU/mL 3.9     Imaging:  - reviewed  DEXA scan:  Date Machine L forearm  BMD/ T-score LFN  BMD/ T-score FRAX Rx    1/23/2025  [employer] Prodigy unit   0.784 g/cm2 / - 1.0  0.703 g/cm2  / - 2.4  1.5% / 0.3%      Assessment and Plan:  # Subclinical hypothyroidism, secondary likely to Hashimoto's thyroiditis  - persistent  - given extreme fatigue, will do trial of levothyrxoine to see if any improvement  Prior notes:  4/9/25  - noted mild TSH elevation with normal fT4 x 1 in 1/2025  - patient reports  nonspecific symptoms such as fatigue and constipation  - anxiety, agitation, moodiness, weight loss could not be explained by thyroid pathology  - discussed indications for subclinical hypothyroidism management: clear indications - clinical hypothyroidism or TSH > 10, suggested if TSH > 7 or symptomatic  - at the moment there is no strong indications for replacement therapy, will repeat TFTs  - discussed that on their own, TPO-Abs do not directly cause thyroid dysfunction or any other pathology; rather, they are markers of an underlying autoimmune process that may or may not lead to abnormal thyroid functionon   5/29/25  - persistent  - positive markers of Hashimoto's thyroiditis  - TSH is < 7, concerns of AI -> hold on LT4 replacement therapy  Plan:  Overall reassured the patient.   Majority of reported symptoms could not be explained by thyroid pathology.   Repeat TFTs in 6 mo  - FREE THYROXINE; Future  - TSH; Future    # Osteopenia  # Vit D deficiency  -  continue vit D deficiency replacement, weight bearing exercises  Prior notes:  4/9/25  - DEXA scan was done earlier than we usually do due to concern of long-term effect of Depo-Provera +/- low BMI  - given premenopausal state and young age of the patient, no significant concerns of fractures  - will evaluate for all other possible secondary causes of osteopenia  5/29/25  - work up for secondary causes of low BMD - negative  - clinical suspicion for Cushing's syndrome is very low -> no testing  - main factor - low BMI  - urine Ca is low but urine collection was sub-optimal and could not be trusted as urine creatinine is low - likely underestimating urine CA  Plan:  No treatment is required at the moment.   Repeat DEXA scan in 1/2027  Continue vit D replacement  Continue weight bearing exercises    # Chronic fatigue  # Low BMI, difficulty gaining weight  - ACTH stim test showed adequate raise of cortisol, interestingly patient felt much more energetic after ACTH  injection  - ACTH stim test could be not informative in case of acute secondary adrenal insufficiency, but patient's concerns are chronic, no evidence of other pituitary dysfunction, isolated secondary adrenal insufficiency is extremely rare, she was not on any steroid therapy  - am cortisol borderline low possibly due to night shift schedule  Prior notes:  4/9/25  - it is reasonable to rule out adrenal sufficiency, especially in the light of other autoimmune disease -Hashimoto thyroiditis  - however, no pigmentation, nausea, vomiting, muscle weakness, lightheadedness, dizziness  5/29/25  - noted low am cortisol and ACTH - concerning of secondary adrenal insufficiency  - patient denies Hx of steroid intake, severe headache (even had history of migraines that lately subsided), peripheral vision changes  - proceed with ACTH stim test, evaluate pituitary function  Plan:  No evidence of adrenal insufficiency.   Evaluation for other possible causes of weight loss  3.   Patient has an appointment with GI specialist in 6/2025.    # Anxiety  # Mood changes  Prior notes:  5/29/25  - unlikely to related to any endocrine pathology  - further evaluation by PCP/psychiatrist    RTC: 2 mo    Total time (face-to-face and non-face-to face time): 40 min - extensive discussion of diagnoses, treatment, prognosis,  lab review, documentation.    Plan reviewed with the patient and agreed with plan.  All questions answered to patient's satisfaction.  Thank you kindly for allowing me to participate in the care plan for this patient.    Anum Tillman MD    CC:   Wyatt Lizarraga M.D.

## 2025-06-19 ENCOUNTER — OFFICE VISIT (OUTPATIENT)
Dept: ENDOCRINOLOGY | Facility: MEDICAL CENTER | Age: 45
End: 2025-06-19
Attending: STUDENT IN AN ORGANIZED HEALTH CARE EDUCATION/TRAINING PROGRAM
Payer: COMMERCIAL

## 2025-06-19 VITALS
BODY MASS INDEX: 17.08 KG/M2 | SYSTOLIC BLOOD PRESSURE: 106 MMHG | DIASTOLIC BLOOD PRESSURE: 60 MMHG | WEIGHT: 108.8 LBS | HEART RATE: 56 BPM | OXYGEN SATURATION: 100 % | HEIGHT: 67 IN

## 2025-06-19 DIAGNOSIS — E03.8 SUBCLINICAL HYPOTHYROIDISM: Primary | ICD-10-CM

## 2025-06-19 PROCEDURE — 99213 OFFICE O/P EST LOW 20 MIN: CPT | Performed by: STUDENT IN AN ORGANIZED HEALTH CARE EDUCATION/TRAINING PROGRAM

## 2025-06-19 RX ORDER — LEVOTHYROXINE SODIUM 50 UG/1
50 TABLET ORAL
Qty: 30 TABLET | Refills: 11 | Status: SHIPPED | OUTPATIENT
Start: 2025-06-19

## 2025-06-19 ASSESSMENT — FIBROSIS 4 INDEX: FIB4 SCORE: 0.56
